# Patient Record
Sex: FEMALE | Race: WHITE | Employment: OTHER | URBAN - METROPOLITAN AREA
[De-identification: names, ages, dates, MRNs, and addresses within clinical notes are randomized per-mention and may not be internally consistent; named-entity substitution may affect disease eponyms.]

---

## 2017-01-26 PROCEDURE — 87086 URINE CULTURE/COLONY COUNT: CPT | Performed by: FAMILY MEDICINE

## 2017-01-27 ENCOUNTER — LAB REQUISITION (OUTPATIENT)
Dept: LAB | Facility: HOSPITAL | Age: 82
End: 2017-01-27
Attending: FAMILY MEDICINE
Payer: MEDICARE

## 2017-01-27 DIAGNOSIS — N39.0 URINARY TRACT INFECTION: ICD-10-CM

## 2017-01-27 DIAGNOSIS — I10 ESSENTIAL (PRIMARY) HYPERTENSION: ICD-10-CM

## 2017-04-20 ENCOUNTER — TELEPHONE (OUTPATIENT)
Dept: NEUROLOGY | Facility: CLINIC | Age: 82
End: 2017-04-20

## 2017-05-20 ENCOUNTER — LAB REQUISITION (OUTPATIENT)
Dept: LAB | Facility: HOSPITAL | Age: 82
End: 2017-05-20
Payer: MEDICARE

## 2017-05-20 DIAGNOSIS — H81.319 AURAL VERTIGO: ICD-10-CM

## 2017-05-20 LAB
BILIRUB UR QL STRIP.AUTO: NEGATIVE
COLOR UR AUTO: YELLOW
GLUCOSE UR STRIP.AUTO-MCNC: >=500 MG/DL
HYALINE CASTS #/AREA URNS AUTO: PRESENT /LPF
KETONES UR STRIP.AUTO-MCNC: NEGATIVE MG/DL
NITRITE UR QL STRIP.AUTO: NEGATIVE
PH UR STRIP.AUTO: 5 [PH] (ref 4.5–8)
PROT UR STRIP.AUTO-MCNC: 30 MG/DL
RBC #/AREA URNS AUTO: >10 /HPF
SP GR UR STRIP.AUTO: 1.01 (ref 1–1.03)
UROBILINOGEN UR STRIP.AUTO-MCNC: <2 MG/DL
WBC #/AREA URNS AUTO: >50 /HPF

## 2017-05-20 PROCEDURE — 87077 CULTURE AEROBIC IDENTIFY: CPT | Performed by: FAMILY MEDICINE

## 2017-05-20 PROCEDURE — 87086 URINE CULTURE/COLONY COUNT: CPT | Performed by: FAMILY MEDICINE

## 2017-05-20 PROCEDURE — 87186 SC STD MICRODIL/AGAR DIL: CPT | Performed by: FAMILY MEDICINE

## 2017-05-20 PROCEDURE — 81001 URINALYSIS AUTO W/SCOPE: CPT | Performed by: FAMILY MEDICINE

## 2017-06-21 ENCOUNTER — OFFICE VISIT (OUTPATIENT)
Dept: NEUROLOGY | Facility: CLINIC | Age: 82
End: 2017-06-21

## 2017-06-21 VITALS
HEART RATE: 88 BPM | WEIGHT: 180 LBS | SYSTOLIC BLOOD PRESSURE: 150 MMHG | BODY MASS INDEX: 28 KG/M2 | DIASTOLIC BLOOD PRESSURE: 80 MMHG | RESPIRATION RATE: 20 BRPM

## 2017-06-21 DIAGNOSIS — H53.8 BLURRY VISION, BILATERAL: ICD-10-CM

## 2017-06-21 DIAGNOSIS — R47.01 APHASIA: Primary | ICD-10-CM

## 2017-06-21 PROCEDURE — 99215 OFFICE O/P EST HI 40 MIN: CPT | Performed by: OTHER

## 2017-06-21 NOTE — PROGRESS NOTES
[facility] Neurology outpatient progress note  Date of service: 6/21/2017    Pt here for f/u on one event of speech difficulty and possible TIA, Pt reported some blurry vision today, it has been a few weeks she experienced difficulty watching TV and reading.  Joanna MetFORMIN HCl ER (GLUCOPHAGE-XR) 500 MG Oral Tablet 24 Hr, Take 500 mg by mouth 2 (two) times daily. , Disp: , Rfl:   •  Multiple Vitamin (TAB-A-ROSA ISELA) Oral Tab, Take 1 tablet by mouth daily. , Disp: , Rfl:   •  dextromethorphan-guaiFENesin (ROBITUSSIN DM) essential hypertension    • Vertigo    • Arthritis    • Weakness    • Other and unspecified hyperlipidemia    • TIA (transient ischemic attack)    • History of blood transfusion    • Pneumonia    • Diverticulitis    • Overactive bladder    • Stroke ([facility] of TIA/stroke  A-fib  Recommendations:  Cont current meds  MRI BRAIN/ORBITS  (CPT=70551/81556)  Ophthalmologist follow up (she has one in [place] already)  Stroke education given  Stroke risk factors management by primary and/or specialists  See orders and

## 2017-06-21 NOTE — PATIENT INSTRUCTIONS
Refill policies:    • Allow 2-3 business days for refills; controlled substances may take longer.   • Contact your pharmacy at least 5 days prior to running out of medication and have them send an electronic request or submit request through the [facility] have a procedure or additional testing performed. [facility]) will contact your insurance carrier to obtain pre-certification or prior authorization.     Unfortunately, [facility] has seen an increase in denial of payment even though the p

## 2017-06-24 ENCOUNTER — LAB REQUISITION (OUTPATIENT)
Dept: LAB | Facility: HOSPITAL | Age: 82
End: 2017-06-24
Attending: FAMILY MEDICINE
Payer: MEDICARE

## 2017-06-24 DIAGNOSIS — I15.9 SECONDARY HYPERTENSION: ICD-10-CM

## 2017-06-24 DIAGNOSIS — N39.0 URINARY TRACT INFECTION: ICD-10-CM

## 2017-06-24 PROCEDURE — 87086 URINE CULTURE/COLONY COUNT: CPT | Performed by: FAMILY MEDICINE

## 2017-06-24 PROCEDURE — 81001 URINALYSIS AUTO W/SCOPE: CPT | Performed by: FAMILY MEDICINE

## 2017-06-24 PROCEDURE — 87077 CULTURE AEROBIC IDENTIFY: CPT | Performed by: FAMILY MEDICINE

## 2017-06-24 PROCEDURE — 87186 SC STD MICRODIL/AGAR DIL: CPT | Performed by: FAMILY MEDICINE

## 2017-06-27 ENCOUNTER — HOSPITAL ENCOUNTER (OUTPATIENT)
Dept: MRI IMAGING | Age: 82
Discharge: HOME | End: 2017-06-27
Attending: Other
Payer: MEDICARE

## 2017-06-27 DIAGNOSIS — H53.8 BLURRY VISION, BILATERAL: ICD-10-CM

## 2017-06-27 DIAGNOSIS — R47.01 APHASIA: ICD-10-CM

## 2017-06-27 PROCEDURE — 70540 MRI ORBIT/FACE/NECK W/O DYE: CPT | Performed by: OTHER

## 2017-06-27 PROCEDURE — 70551 MRI BRAIN STEM W/O DYE: CPT | Performed by: OTHER

## 2017-07-20 ENCOUNTER — HOSPITAL ENCOUNTER (EMERGENCY)
Facility: HOSPITAL | Age: 82
Discharge: HOME | End: 2017-07-20
Attending: EMERGENCY MEDICINE
Payer: MEDICARE

## 2017-07-20 ENCOUNTER — HOSPITAL ENCOUNTER (OUTPATIENT)
Dept: ULTRASOUND IMAGING | Facility: HOSPITAL | Age: 82
End: 2017-07-20
Attending: FAMILY MEDICINE
Payer: MEDICARE

## 2017-07-20 ENCOUNTER — HOSPITAL ENCOUNTER (OUTPATIENT)
Dept: ULTRASOUND IMAGING | Facility: HOSPITAL | Age: 82
Discharge: HOME | End: 2017-07-20
Attending: FAMILY MEDICINE
Payer: MEDICARE

## 2017-07-20 VITALS
RESPIRATION RATE: 16 BRPM | OXYGEN SATURATION: 95 % | WEIGHT: 180.75 LBS | SYSTOLIC BLOOD PRESSURE: 155 MMHG | HEIGHT: 67 IN | BODY MASS INDEX: 28.37 KG/M2 | DIASTOLIC BLOOD PRESSURE: 70 MMHG | HEART RATE: 74 BPM | TEMPERATURE: 97 F

## 2017-07-20 DIAGNOSIS — M79.605 LEFT LEG PAIN: ICD-10-CM

## 2017-07-20 DIAGNOSIS — I73.9 PERIPHERAL ARTERIAL DISEASE (HCC): Primary | ICD-10-CM

## 2017-07-20 DIAGNOSIS — Z91.81 AT RISK FOR FALLING: ICD-10-CM

## 2017-07-20 PROCEDURE — 99282 EMERGENCY DEPT VISIT SF MDM: CPT

## 2017-07-20 PROCEDURE — 99283 EMERGENCY DEPT VISIT LOW MDM: CPT

## 2017-07-20 PROCEDURE — 93926 LOWER EXTREMITY STUDY: CPT | Performed by: FAMILY MEDICINE

## 2017-07-20 NOTE — ED INITIAL ASSESSMENT (HPI)
Pt c/o intermittent left leg pain x 3 weeks, had an US today which showed DVT. Pt denies c/o chest pain or SOB.

## 2017-07-21 NOTE — ED PROVIDER NOTES
Patient Seen in: [facility] Emergency Department    History   Patient presents with:  Deep Vein Thrombosis (cardiovascular)    Stated Complaint: + DVT    HPI    This is a 40-year-old female complaining of left leg pain this patient lives at assisted [facility]) 180 MG Oral Tab,  Take 180 mg by mouth daily. TraMADol HCl 50 MG Oral Tab,  Take 50 mg by mouth every 6 (six) hours as needed. furosemide 20 MG Oral Tab,  Take 20 mg by mouth daily.    ELIQUIS 2.5 MG Oral Tab,  Take 2.5 mg by mouth 2 (two) times da acetaminophen (TYLENOL) 325 MG Oral Tab,  Take 650 mg by mouth every 6 (six) hours as needed for Pain or Fever.  Do not exceed 4 grams       Family History   Problem Relation Age of Onset   • Diabetes Father    • Cancer Mother    • Breast Cancer Mother Course  ------------------------------------------------------------  MDM   CONCLUSION:    1. Findings are highly suspicious for acute or subacute thrombus within the left superficial femoral artery with complete occlusion in the proximal and mid SFA.  Ther

## 2017-08-30 ENCOUNTER — APPOINTMENT (OUTPATIENT)
Dept: CT IMAGING | Facility: HOSPITAL | Age: 82
DRG: 092 | End: 2017-08-30
Attending: EMERGENCY MEDICINE
Payer: MEDICARE

## 2017-08-30 ENCOUNTER — APPOINTMENT (OUTPATIENT)
Dept: CV DIAGNOSTICS | Facility: HOSPITAL | Age: 82
DRG: 092 | End: 2017-08-30
Attending: NURSE PRACTITIONER
Payer: MEDICARE

## 2017-08-30 ENCOUNTER — APPOINTMENT (OUTPATIENT)
Dept: GENERAL RADIOLOGY | Facility: HOSPITAL | Age: 82
DRG: 092 | End: 2017-08-30
Attending: EMERGENCY MEDICINE
Payer: MEDICARE

## 2017-08-30 ENCOUNTER — HOSPITAL ENCOUNTER (INPATIENT)
Facility: HOSPITAL | Age: 82
LOS: 2 days | Discharge: NURSING, REHAB OR LONG-TERM CARE | DRG: 092 | End: 2017-09-02
Attending: EMERGENCY MEDICINE | Admitting: HOSPITALIST
Payer: MEDICARE

## 2017-08-30 ENCOUNTER — APPOINTMENT (OUTPATIENT)
Dept: MRI IMAGING | Facility: HOSPITAL | Age: 82
DRG: 092 | End: 2017-08-30
Attending: EMERGENCY MEDICINE
Payer: MEDICARE

## 2017-08-30 DIAGNOSIS — G45.9 TRANSIENT CEREBRAL ISCHEMIA, UNSPECIFIED TYPE: Primary | ICD-10-CM

## 2017-08-30 LAB
ALBUMIN SERPL-MCNC: 3.5 G/DL (ref 3.5–4.8)
ALP LIVER SERPL-CCNC: 64 U/L (ref 55–142)
ALT SERPL-CCNC: 19 U/L (ref 14–54)
APTT PPP: 28.7 SECONDS (ref 25–34)
AST SERPL-CCNC: 14 U/L (ref 15–41)
ATRIAL RATE: 73 BPM
BASOPHILS # BLD AUTO: 0.08 X10(3) UL (ref 0–0.1)
BASOPHILS NFR BLD AUTO: 0.8 %
BILIRUB SERPL-MCNC: 0.5 MG/DL (ref 0.1–2)
BILIRUB UR QL STRIP.AUTO: NEGATIVE
BUN BLD-MCNC: 10 MG/DL (ref 8–20)
CALCIUM BLD-MCNC: 9.4 MG/DL (ref 8.3–10.3)
CHLORIDE: 103 MMOL/L (ref 101–111)
CHOLEST SMN-MCNC: 157 MG/DL (ref ?–200)
CHOLEST SMN-MCNC: 171 MG/DL (ref ?–200)
CLARITY UR REFRACT.AUTO: CLEAR
CO2: 31 MMOL/L (ref 22–32)
COLOR UR AUTO: YELLOW
CREAT BLD-MCNC: 0.6 MG/DL (ref 0.55–1.02)
EOSINOPHIL # BLD AUTO: 0.07 X10(3) UL (ref 0–0.3)
EOSINOPHIL NFR BLD AUTO: 0.7 %
ERYTHROCYTE [DISTWIDTH] IN BLOOD BY AUTOMATED COUNT: 14.3 % (ref 11.5–16)
EST. AVERAGE GLUCOSE BLD GHB EST-MCNC: 177 MG/DL (ref 68–126)
EST. AVERAGE GLUCOSE BLD GHB EST-MCNC: 177 MG/DL (ref 68–126)
GLUCOSE BLD-MCNC: 127 MG/DL (ref 65–99)
GLUCOSE BLD-MCNC: 134 MG/DL (ref 65–99)
GLUCOSE BLD-MCNC: 144 MG/DL (ref 70–99)
GLUCOSE BLD-MCNC: 145 MG/DL (ref 65–99)
GLUCOSE BLD-MCNC: 145 MG/DL (ref 65–99)
GLUCOSE UR STRIP.AUTO-MCNC: NEGATIVE MG/DL
HBA1C MFR BLD HPLC: 7.8 % (ref ?–5.7)
HBA1C MFR BLD HPLC: 7.8 % (ref ?–5.7)
HCT VFR BLD AUTO: 42.7 % (ref 34–50)
HDLC SERPL-MCNC: 75 MG/DL (ref 45–?)
HDLC SERPL-MCNC: 77 MG/DL (ref 45–?)
HDLC SERPL: 2.09 {RATIO} (ref ?–4.44)
HDLC SERPL: 2.22 {RATIO} (ref ?–4.44)
HGB BLD-MCNC: 14.3 G/DL (ref 12–16)
IMMATURE GRANULOCYTE COUNT: 0.04 X10(3) UL (ref 0–1)
IMMATURE GRANULOCYTE RATIO %: 0.4 %
INR BLD: 1.13 (ref 0.89–1.11)
INR BLD: 1.13 (ref 0.89–1.11)
KETONES UR STRIP.AUTO-MCNC: NEGATIVE MG/DL
LDLC SERPL CALC-MCNC: 37 MG/DL (ref ?–130)
LDLC SERPL CALC-MCNC: 45 MG/DL (ref ?–130)
LDLC SERPL-MCNC: 37 MG/DL (ref 5–40)
LDLC SERPL-MCNC: 57 MG/DL (ref 5–40)
LEUKOCYTE ESTERASE UR QL STRIP.AUTO: NEGATIVE
LYMPHOCYTES # BLD AUTO: 1.69 X10(3) UL (ref 0.9–4)
LYMPHOCYTES NFR BLD AUTO: 16.2 %
M PROTEIN MFR SERPL ELPH: 6.7 G/DL (ref 6.1–8.3)
MCH RBC QN AUTO: 31.1 PG (ref 27–33.2)
MCHC RBC AUTO-ENTMCNC: 33.5 G/DL (ref 31–37)
MCV RBC AUTO: 92.8 FL (ref 81–100)
MONOCYTES # BLD AUTO: 0.84 X10(3) UL (ref 0.1–0.6)
MONOCYTES NFR BLD AUTO: 8 %
NEUTROPHIL ABS PRELIM: 7.72 X10 (3) UL (ref 1.3–6.7)
NEUTROPHILS # BLD AUTO: 7.72 X10(3) UL (ref 1.3–6.7)
NEUTROPHILS NFR BLD AUTO: 73.9 %
NITRITE UR QL STRIP.AUTO: NEGATIVE
NONHDLC SERPL-MCNC: 82 MG/DL (ref ?–130)
NONHDLC SERPL-MCNC: 94 MG/DL (ref ?–130)
P AXIS: 76 DEGREES
P-R INTERVAL: 192 MS
PH UR STRIP.AUTO: 6 [PH] (ref 4.5–8)
PLATELET # BLD AUTO: 288 10(3)UL (ref 150–450)
POTASSIUM SERPL-SCNC: 3.9 MMOL/L (ref 3.6–5.1)
PRO-BETA NATRIURETIC PEPTIDE: 295 PG/ML (ref ?–450)
PROT UR STRIP.AUTO-MCNC: NEGATIVE MG/DL
PSA SERPL DL<=0.01 NG/ML-MCNC: 14.6 SECONDS (ref 12–14.3)
PSA SERPL DL<=0.01 NG/ML-MCNC: 14.6 SECONDS (ref 12–14.3)
Q-T INTERVAL: 386 MS
QRS DURATION: 76 MS
QTC CALCULATION (BEZET): 425 MS
R AXIS: -23 DEGREES
RBC # BLD AUTO: 4.6 X10(6)UL (ref 3.8–5.1)
RBC UR QL AUTO: NEGATIVE
RED CELL DISTRIBUTION WIDTH-SD: 47.8 FL (ref 35.1–46.3)
SODIUM SERPL-SCNC: 140 MMOL/L (ref 136–144)
SP GR UR STRIP.AUTO: 1.01 (ref 1–1.03)
T AXIS: 59 DEGREES
TRIGLYCERIDES: 183 MG/DL (ref ?–150)
TRIGLYCERIDES: 284 MG/DL (ref ?–150)
TROPONIN: <0.046 NG/ML (ref ?–0.05)
UROBILINOGEN UR STRIP.AUTO-MCNC: <2 MG/DL
VENTRICULAR RATE: 73 BPM
WBC # BLD AUTO: 10.4 X10(3) UL (ref 4–13)

## 2017-08-30 PROCEDURE — 99223 1ST HOSP IP/OBS HIGH 75: CPT | Performed by: INTERNAL MEDICINE

## 2017-08-30 PROCEDURE — 71010 XR CHEST AP PORTABLE  (CPT=71010): CPT | Performed by: EMERGENCY MEDICINE

## 2017-08-30 PROCEDURE — 93306 TTE W/DOPPLER COMPLETE: CPT | Performed by: NURSE PRACTITIONER

## 2017-08-30 PROCEDURE — 70544 MR ANGIOGRAPHY HEAD W/O DYE: CPT | Performed by: EMERGENCY MEDICINE

## 2017-08-30 PROCEDURE — 70450 CT HEAD/BRAIN W/O DYE: CPT | Performed by: EMERGENCY MEDICINE

## 2017-08-30 PROCEDURE — 70551 MRI BRAIN STEM W/O DYE: CPT | Performed by: EMERGENCY MEDICINE

## 2017-08-30 RX ORDER — POLYETHYLENE GLYCOL 3350 17 G/17G
17 POWDER, FOR SOLUTION ORAL DAILY PRN
Status: DISCONTINUED | OUTPATIENT
Start: 2017-08-30 | End: 2017-09-02

## 2017-08-30 RX ORDER — ASPIRIN 81 MG/1
324 TABLET, CHEWABLE ORAL ONCE
Status: DISCONTINUED | OUTPATIENT
Start: 2017-08-30 | End: 2017-09-01

## 2017-08-30 RX ORDER — SODIUM PHOSPHATE, DIBASIC AND SODIUM PHOSPHATE, MONOBASIC 7; 19 G/133ML; G/133ML
1 ENEMA RECTAL ONCE AS NEEDED
Status: DISCONTINUED | OUTPATIENT
Start: 2017-08-30 | End: 2017-08-30

## 2017-08-30 RX ORDER — PRAVASTATIN SODIUM 20 MG
20 TABLET ORAL NIGHTLY
Status: DISCONTINUED | OUTPATIENT
Start: 2017-08-30 | End: 2017-09-02

## 2017-08-30 RX ORDER — ASPIRIN 300 MG
300 SUPPOSITORY, RECTAL RECTAL DAILY
Status: DISCONTINUED | OUTPATIENT
Start: 2017-08-30 | End: 2017-09-01

## 2017-08-30 RX ORDER — METOCLOPRAMIDE HYDROCHLORIDE 5 MG/ML
10 INJECTION INTRAMUSCULAR; INTRAVENOUS EVERY 8 HOURS PRN
Status: DISCONTINUED | OUTPATIENT
Start: 2017-08-30 | End: 2017-09-02

## 2017-08-30 RX ORDER — ACETAMINOPHEN 325 MG/1
650 TABLET ORAL EVERY 4 HOURS PRN
Status: DISCONTINUED | OUTPATIENT
Start: 2017-08-30 | End: 2017-09-02

## 2017-08-30 RX ORDER — TETRAHYDROZOLINE HCL 0.05 %
1 DROPS OPHTHALMIC (EYE) 4 TIMES DAILY PRN
Status: ON HOLD | COMMUNITY
End: 2018-02-08

## 2017-08-30 RX ORDER — SODIUM PHOSPHATE, DIBASIC AND SODIUM PHOSPHATE, MONOBASIC 7; 19 G/133ML; G/133ML
1 ENEMA RECTAL ONCE AS NEEDED
Status: DISCONTINUED | OUTPATIENT
Start: 2017-08-30 | End: 2017-09-02

## 2017-08-30 RX ORDER — ONDANSETRON 2 MG/ML
4 INJECTION INTRAMUSCULAR; INTRAVENOUS EVERY 6 HOURS PRN
Status: DISCONTINUED | OUTPATIENT
Start: 2017-08-30 | End: 2017-09-02

## 2017-08-30 RX ORDER — PHENYLEPHRINE HCL IN 0.9% NACL 50MG/250ML
PLASTIC BAG, INJECTION (ML) INTRAVENOUS CONTINUOUS PRN
Status: DISCONTINUED | OUTPATIENT
Start: 2017-08-30 | End: 2017-09-02

## 2017-08-30 RX ORDER — BISACODYL 10 MG
10 SUPPOSITORY, RECTAL RECTAL
Status: DISCONTINUED | OUTPATIENT
Start: 2017-08-30 | End: 2017-09-02

## 2017-08-30 RX ORDER — SENNOSIDES 8.6 MG
17.2 TABLET ORAL NIGHTLY
Status: DISCONTINUED | OUTPATIENT
Start: 2017-08-30 | End: 2017-08-30

## 2017-08-30 RX ORDER — SODIUM CHLORIDE 9 MG/ML
INJECTION, SOLUTION INTRAVENOUS CONTINUOUS
Status: ACTIVE | OUTPATIENT
Start: 2017-08-30 | End: 2017-09-01

## 2017-08-30 RX ORDER — ATORVASTATIN CALCIUM 80 MG/1
80 TABLET, FILM COATED ORAL NIGHTLY
Status: DISCONTINUED | OUTPATIENT
Start: 2017-08-30 | End: 2017-08-30

## 2017-08-30 RX ORDER — DEXTROSE MONOHYDRATE 25 G/50ML
50 INJECTION, SOLUTION INTRAVENOUS
Status: DISCONTINUED | OUTPATIENT
Start: 2017-08-30 | End: 2017-09-02

## 2017-08-30 RX ORDER — GARLIC EXTRACT 500 MG
1 CAPSULE ORAL 2 TIMES DAILY
Status: DISCONTINUED | OUTPATIENT
Start: 2017-08-30 | End: 2017-09-02

## 2017-08-30 RX ORDER — MORPHINE SULFATE 4 MG/ML
1 INJECTION, SOLUTION INTRAMUSCULAR; INTRAVENOUS EVERY 2 HOUR PRN
Status: DISCONTINUED | OUTPATIENT
Start: 2017-08-30 | End: 2017-08-30

## 2017-08-30 RX ORDER — MORPHINE SULFATE 4 MG/ML
2 INJECTION, SOLUTION INTRAMUSCULAR; INTRAVENOUS EVERY 2 HOUR PRN
Status: DISCONTINUED | OUTPATIENT
Start: 2017-08-30 | End: 2017-08-30

## 2017-08-30 RX ORDER — DOCUSATE SODIUM 100 MG/1
100 CAPSULE, LIQUID FILLED ORAL 2 TIMES DAILY
Status: DISCONTINUED | OUTPATIENT
Start: 2017-08-30 | End: 2017-09-02

## 2017-08-30 RX ORDER — FAMOTIDINE 10 MG/ML
20 INJECTION, SOLUTION INTRAVENOUS DAILY
Status: DISCONTINUED | OUTPATIENT
Start: 2017-08-30 | End: 2017-09-02

## 2017-08-30 RX ORDER — LABETALOL HYDROCHLORIDE 5 MG/ML
10 INJECTION, SOLUTION INTRAVENOUS EVERY 10 MIN PRN
Status: DISCONTINUED | OUTPATIENT
Start: 2017-08-30 | End: 2017-09-02

## 2017-08-30 RX ORDER — METOPROLOL SUCCINATE 100 MG/1
100 TABLET, EXTENDED RELEASE ORAL
Status: DISCONTINUED | OUTPATIENT
Start: 2017-08-30 | End: 2017-09-02

## 2017-08-30 RX ORDER — TRAMADOL HYDROCHLORIDE 50 MG/1
50 TABLET ORAL EVERY 6 HOURS PRN
Status: DISCONTINUED | OUTPATIENT
Start: 2017-08-30 | End: 2017-09-02

## 2017-08-30 RX ORDER — FAMOTIDINE 10 MG/ML
20 INJECTION, SOLUTION INTRAVENOUS DAILY
Status: DISCONTINUED | OUTPATIENT
Start: 2017-08-30 | End: 2017-08-30

## 2017-08-30 RX ORDER — ACETAMINOPHEN 650 MG/1
650 SUPPOSITORY RECTAL EVERY 4 HOURS PRN
Status: DISCONTINUED | OUTPATIENT
Start: 2017-08-30 | End: 2017-08-30

## 2017-08-30 RX ORDER — ACETAMINOPHEN 325 MG/1
650 TABLET ORAL EVERY 4 HOURS PRN
Status: DISCONTINUED | OUTPATIENT
Start: 2017-08-30 | End: 2017-08-30

## 2017-08-30 RX ORDER — ONDANSETRON 2 MG/ML
4 INJECTION INTRAMUSCULAR; INTRAVENOUS EVERY 6 HOURS PRN
Status: DISCONTINUED | OUTPATIENT
Start: 2017-08-30 | End: 2017-08-30

## 2017-08-30 RX ORDER — ASPIRIN 325 MG
325 TABLET ORAL DAILY
Status: DISCONTINUED | OUTPATIENT
Start: 2017-08-30 | End: 2017-08-31

## 2017-08-30 RX ORDER — FAMOTIDINE 20 MG/1
20 TABLET ORAL DAILY
Status: DISCONTINUED | OUTPATIENT
Start: 2017-08-30 | End: 2017-09-02

## 2017-08-30 RX ORDER — HYDROCODONE BITARTRATE AND ACETAMINOPHEN 5; 325 MG/1; MG/1
1 TABLET ORAL EVERY 6 HOURS PRN
Status: ON HOLD | COMMUNITY
End: 2018-10-26

## 2017-08-30 RX ORDER — FAMOTIDINE 20 MG/1
20 TABLET ORAL DAILY
Status: DISCONTINUED | OUTPATIENT
Start: 2017-08-30 | End: 2017-08-30

## 2017-08-30 RX ORDER — BISACODYL 10 MG
10 SUPPOSITORY, RECTAL RECTAL
Status: DISCONTINUED | OUTPATIENT
Start: 2017-08-30 | End: 2017-08-30

## 2017-08-30 RX ORDER — FUROSEMIDE 20 MG/1
20 TABLET ORAL DAILY
Status: DISCONTINUED | OUTPATIENT
Start: 2017-08-30 | End: 2017-09-02

## 2017-08-30 RX ORDER — MORPHINE SULFATE 4 MG/ML
2 INJECTION, SOLUTION INTRAMUSCULAR; INTRAVENOUS EVERY 2 HOUR PRN
Status: DISCONTINUED | OUTPATIENT
Start: 2017-08-30 | End: 2017-09-02

## 2017-08-30 RX ORDER — ASPIRIN AND DIPYRIDAMOLE 25; 200 MG/1; MG/1
1 CAPSULE, EXTENDED RELEASE ORAL 2 TIMES DAILY
Status: DISCONTINUED | OUTPATIENT
Start: 2017-08-30 | End: 2017-09-02

## 2017-08-30 RX ORDER — DOCUSATE SODIUM 100 MG/1
100 CAPSULE, LIQUID FILLED ORAL 2 TIMES DAILY
Status: DISCONTINUED | OUTPATIENT
Start: 2017-08-30 | End: 2017-08-30

## 2017-08-30 RX ORDER — POLYETHYLENE GLYCOL 3350 17 G/17G
17 POWDER, FOR SOLUTION ORAL DAILY PRN
Status: DISCONTINUED | OUTPATIENT
Start: 2017-08-30 | End: 2017-08-30

## 2017-08-30 RX ORDER — METOCLOPRAMIDE HYDROCHLORIDE 5 MG/ML
10 INJECTION INTRAMUSCULAR; INTRAVENOUS EVERY 8 HOURS PRN
Status: DISCONTINUED | OUTPATIENT
Start: 2017-08-30 | End: 2017-08-30

## 2017-08-30 RX ORDER — GUAIFENESIN 100 MG/5ML
100 SYRUP ORAL EVERY 4 HOURS PRN
COMMUNITY
End: 2018-09-21 | Stop reason: CLARIF

## 2017-08-30 RX ORDER — LABETALOL HYDROCHLORIDE 5 MG/ML
10 INJECTION, SOLUTION INTRAVENOUS EVERY 10 MIN PRN
Status: DISCONTINUED | OUTPATIENT
Start: 2017-08-30 | End: 2017-08-30

## 2017-08-30 RX ORDER — PREDNISONE 1 MG/1
5 TABLET ORAL DAILY
Status: DISCONTINUED | OUTPATIENT
Start: 2017-08-30 | End: 2017-09-02

## 2017-08-30 RX ORDER — MORPHINE SULFATE 4 MG/ML
1 INJECTION, SOLUTION INTRAMUSCULAR; INTRAVENOUS EVERY 2 HOUR PRN
Status: DISCONTINUED | OUTPATIENT
Start: 2017-08-30 | End: 2017-09-02

## 2017-08-30 RX ORDER — SENNOSIDES 8.6 MG
17.2 TABLET ORAL NIGHTLY
Status: DISCONTINUED | OUTPATIENT
Start: 2017-08-30 | End: 2017-09-02

## 2017-08-30 RX ORDER — DILTIAZEM HYDROCHLORIDE 180 MG/1
180 CAPSULE, EXTENDED RELEASE ORAL DAILY
Status: DISCONTINUED | OUTPATIENT
Start: 2017-08-30 | End: 2017-09-02

## 2017-08-30 RX ORDER — ACETAMINOPHEN 650 MG/1
650 SUPPOSITORY RECTAL EVERY 4 HOURS PRN
Status: DISCONTINUED | OUTPATIENT
Start: 2017-08-30 | End: 2017-09-02

## 2017-08-30 NOTE — PROGRESS NOTES
[id number] [facility] Evaluation    Linda Thakurlester Patient Status:  Emergency    1924 MRN [id number]   Location [address] Attending Orlin Oakes, [facility] Day # 0 PCP MD Henry Zazueta 1788 SURGICAL HISTORY      Comment: hernia repair  No date: OTHER SURGICAL HISTORY      Comment: femur fx repair  No date: REPAIR ING HERNIA,5+Y/O,REDUCIBL    FAMILY HISTORY:  family history includes Breast Cancer in her mother; Cancer in her mother; Diabetes i or leg weakness noted. Finger-to-nose coordination is intact. Gait deferred.   NIH 4.    DIAGNOSTIC DATA:   Lab Results  Component Value Date   WBC 10.4 08/30/2017   HGB 14.3 08/30/2017   HCT 42.7 08/30/2017   .0 08/30/2017   CREATSERUM 0.60 08/30/2

## 2017-08-30 NOTE — PROGRESS NOTES
08/30/17 1738   Clinical Encounter Type   Visited With Patient and family together  (Daughter at bedside)   Routine Visit Introduction   Continue Visiting No   Patient's Supportive Strategies/Resources  provided emotional support, including acti

## 2017-08-30 NOTE — PROGRESS NOTES
Attempted echo. Half way through exam patient became agitated and refused to finish exam.  Nurse notified.

## 2017-08-30 NOTE — SLP NOTE
Attempted to see pt for speech therapy services. Pt not available due to testing at this time. Will follow up as scheduling permits. Thank you.     Briana Galdamez M.S. [address] [facility]  Pager [phone]

## 2017-08-30 NOTE — ED PROVIDER NOTES
Patient Seen in: [facility] Emergency Department    History   Patient presents with:  Stroke (neurologic)    Stated Complaint:     HPI    24-year-old female presents emergency room for evaluation of possible stroke symptoms.   Patient presents with s by mouth daily. Fexofenadine HCl (HM FEXOFENADINE HCL) 180 MG Oral Tab,  Take 180 mg by mouth daily. TraMADol HCl 50 MG Oral Tab,  Take 50 mg by mouth every 6 (six) hours as needed. furosemide 20 MG Oral Tab,  Take 20 mg by mouth daily.    ELIQUIS 2.5 20 MG Oral Tab,  Take 20 mg by mouth nightly. acetaminophen (TYLENOL) 325 MG Oral Tab,  Take 650 mg by mouth every 6 (six) hours as needed for Pain or Fever.  Do not exceed 4 grams       Family History   Problem Relation Age of Onset   • Diabetes Father tenderness  EXTREMITIES: No peripheral edema, no calf tenderness, dorsal pedal pulses present and equal bilaterally. SKIN: Warm, dry, intact, no rashes.   NEUROLOGIC EXAM: Tongue midline, no facial drooping, no ptosis, muscle strength +5/5 bilateral upper BLUE   RAINBOW DRAW GOLD   RAINBOW DRAW LAVENDER   RAINBOW DRAW LIGHT GREEN     EKG    Rate, intervals and axes as noted on EKG Report.   Rate: 73  Rhythm: Sinus Rhythm  Reading: Normal sinus rhythm, no acute ST or T-wave abnormality         A total of 35 m

## 2017-08-30 NOTE — H&P
[facility] HOSPITALIST  History and Physical     [facility] Patient Status:  Emergency    1924 MRN [id number]   Location [address] [facility] Attending Ab Villatoro, [address] [facility][note 3] Day # 0 PCP Annette Phan MD     Chief Complaint fx repair  No date: REPAIR ING HERNIA,5+Y/O,REDUCIBL    Social History:  reports that she quit smoking about 28 years ago. Her smoking use included Cigarettes. She has a 75.00 pack-year smoking history.  She has never used smokeless tobacco. She reports love MetFORMIN HCl ER (GLUCOPHAGE-XR) 500 MG Oral Tablet 24 Hr Take 500 mg by mouth 2 (two) times daily. Disp:  Rfl:    Multiple Vitamin (TAB-A-ROSA ISELA) Oral Tab Take 1 tablet by mouth daily.  Disp:  Rfl:    dextromethorphan-guaiFENesin (ROBITUSSIN DM)  M membranes. EOM-I. PERRLA. Anicteric. Neck: No lymphadenopathy. No JVD. No carotid bruits. Respiratory: Clear to auscultation bilaterally. No wheezes. No rhonchi. Cardiovascular: S1, S2. Regular rate and rhythm. No murmurs, rubs or gallops. Equal pulses.

## 2017-08-30 NOTE — ED INITIAL ASSESSMENT (HPI)
Per medics pt is from assisted living, care taker noticed pt slurring words and having trouble speaking. Pt denies any pain.

## 2017-08-31 ENCOUNTER — APPOINTMENT (OUTPATIENT)
Dept: CT IMAGING | Facility: HOSPITAL | Age: 82
DRG: 092 | End: 2017-08-31
Attending: NURSE PRACTITIONER
Payer: MEDICARE

## 2017-08-31 LAB
GLUCOSE BLD-MCNC: 183 MG/DL (ref 65–99)
GLUCOSE BLD-MCNC: 193 MG/DL (ref 65–99)
GLUCOSE BLD-MCNC: 201 MG/DL (ref 65–99)
GLUCOSE BLD-MCNC: 240 MG/DL (ref 65–99)
GLUCOSE BLD-MCNC: 41 MG/DL (ref 65–99)
GLUCOSE BLD-MCNC: 44 MG/DL (ref 65–99)

## 2017-08-31 PROCEDURE — 99223 1ST HOSP IP/OBS HIGH 75: CPT | Performed by: OTHER

## 2017-08-31 PROCEDURE — 95816 EEG AWAKE AND DROWSY: CPT | Performed by: OTHER

## 2017-08-31 PROCEDURE — 99239 HOSP IP/OBS DSCHRG MGMT >30: CPT | Performed by: INTERNAL MEDICINE

## 2017-08-31 PROCEDURE — 70450 CT HEAD/BRAIN W/O DYE: CPT | Performed by: NURSE PRACTITIONER

## 2017-08-31 RX ORDER — HALOPERIDOL 5 MG/ML
1 INJECTION INTRAMUSCULAR ONCE
Status: COMPLETED | OUTPATIENT
Start: 2017-08-31 | End: 2017-08-31

## 2017-08-31 NOTE — PLAN OF CARE
Assumed care at 1900. No acute issues overnight. Prn Tramadol for c/o back pain. A/o x 4, forgetful. Still with slight expressive aphasia. Follows commands. NSR on tele. Incontinent, briefed. IVF per order. Vitals stable.      0740: glucose 41

## 2017-08-31 NOTE — PROCEDURES
Date of Procedure: 8/31/2017    Procedure: EEG (ELECTROENCEPHALOGRAM)     DX:  ASPHASIA,  B/L BLURRY VISION  HX:  89 Y/O FEMALE PRESENT TO ED WITH DIFFICULTY SPEAKING.   NOTED, DAUGHTER NOTICED AROUND 9:30 PT WAS UNABLE TO SPEAK, CODE STROKE WAS CALLED AT 1

## 2017-08-31 NOTE — PROGRESS NOTES
Rapid Response Note    RRT called for change in mental status    Patient seen at bedside, daughter at bedside  Patient is alert, awake, oriented to self and time, but not to place  /64 (BP Location: Right arm)   Pulse 64   Temp 97.9 °F (36.6 °C) (Ora

## 2017-08-31 NOTE — CM/SW NOTE
Pt seen for CVA protocol. Pt is a 80 y o female who came from [address] [place] [address] where she has lived since 2010. Pt has 2 dtrs and 1 son. Pt's dtr [facility] is her HCPOA.   Pt has had [facility] in the past.  Pt was at [employer] for [facility] about 5 year

## 2017-08-31 NOTE — SLP NOTE
ADULT SWALLOWING EVALUATION    ASSESSMENT & PLAN   ASSESSMENT  Orders were received for a bedside swallowing evaluation per stroke protocol.  Patient was admitted on 8/30 with stroke protocol initiated and note from neurology Still with slight expressive ap or unspecified type diabetes mellitus without mention of complication, not stated as uncontrolled    • Unspecified essential hypertension    • Urinary tract infection    • Vertigo    • Visual impairment     glasses   • Weakness        Prior Living Situatio implementation of aspiration precautions and swallow strategies independently over 1 session(s). Goal #3 Patient will participate in a speech language cognitive assessment.       FOLLOW UP  Treatment Plan: Communication evaluation;Dysphagia therapy  Heidy

## 2017-08-31 NOTE — OCCUPATIONAL THERAPY NOTE
Pt noted to have increased confusion during OT josy RN present and aware, Rapid Response team called to assess. Pt will need new orders to resume OT when medically appropriate.      Thank you for allowing me to care for this patient,   Robb Rivas,

## 2017-08-31 NOTE — PROGRESS NOTES
[facility] HOSPITALIST  Progress Note     Vivian Newton Patient Status:  Observation    1924 MRN [id number]   [facility] 7NE-A Attending Nestor Clayton MD;Jordi*   Hosp Day # 0 PCP More Chen MD     Chief Complaint: slurred speec • famoTIDine  20 mg Intravenous Daily   • Aspirin-Dipyridamole ER  1 capsule Oral BID   • DilTIAZem HCl ER Coated Beads  180 mg Oral Daily   • apixaban  2.5 mg Oral BID   • furosemide  20 mg Oral Daily   • insulin detemir  41 Units Subcutaneous Nightly

## 2017-08-31 NOTE — PHYSICAL THERAPY NOTE
This therapist presented to room with OT already present with RN. RN and OT assessing patients level of orientation. Daughter present and states her current presentation is not her cognitive baseline. RRT called.  Will follow up with PT assessment as approp

## 2017-08-31 NOTE — OCCUPATIONAL THERAPY NOTE
OCCUPATIONAL THERAPY EVALUATION - INPATIENT     Room Number: 0545/6596-K  Evaluation Date: 8/31/2017  Type of Evaluation: Initial  Presenting Problem: expressive aphasia, transient cerebral ischemia     Physician Order: IP Consult to Occupational Therapy SITE RIG*  No date: OTHER SURGICAL HISTORY Bilateral      Comment: knee replacement  No date: OTHER SURGICAL HISTORY      Comment: colon resection  No date: OTHER SURGICAL HISTORY      Comment: hernia repair  No date: OTHER SURGICAL HISTORY      Comment: f NEUROLOGICAL FINDINGS  Neurological Findings: None                ACTIVITY TOLERANCE  Room air  No shortness of breath    ACTIVITIES OF DAILY LIVING ASSESSMENT  AM-PAC ‘6-Clicks’ Inpatient Daily Activity Short Form  How much help from another person do dysfunction, decreased endurance, balance, strength, ROM and functional mobility. These deficits manifest functionally while performing dressing, bathing, toileting, self-feeding and grooming.    The patient is below baseline and would benefit from skilled

## 2017-08-31 NOTE — PLAN OF CARE
RRT called when patient became confused suddenly  Patient was assessed by Stroke team including dr. Germaine Navarro and Dr. Costa Sharif  CT/CTA ordered       [phone]  Neuro exam post RR, still confused, left side slightly weaker then before  No complaints of pain  Resting c

## 2017-09-01 LAB
GLUCOSE BLD-MCNC: 130 MG/DL (ref 65–99)
GLUCOSE BLD-MCNC: 189 MG/DL (ref 65–99)
GLUCOSE BLD-MCNC: 206 MG/DL (ref 65–99)
GLUCOSE BLD-MCNC: 261 MG/DL (ref 65–99)
GLUCOSE BLD-MCNC: 47 MG/DL (ref 65–99)
GLUCOSE BLD-MCNC: 48 MG/DL (ref 65–99)
GLUCOSE BLD-MCNC: 72 MG/DL (ref 65–99)
GLUCOSE BLD-MCNC: 93 MG/DL (ref 65–99)
GLUCOSE BLD-MCNC: 97 MG/DL (ref 65–99)

## 2017-09-01 PROCEDURE — 99232 SBSQ HOSP IP/OBS MODERATE 35: CPT | Performed by: OTHER

## 2017-09-01 PROCEDURE — 99232 SBSQ HOSP IP/OBS MODERATE 35: CPT | Performed by: INTERNAL MEDICINE

## 2017-09-01 NOTE — PLAN OF CARE
Assumed care at 0100. No acute issues. Pt is alert to self, knows president is Jimmie. Thinks she is in the \"[place]\" and the year is 2080. Follows commands. Denies numbness, tingling or pain. NSR on tele. Vitals stable.

## 2017-09-01 NOTE — PROGRESS NOTES
Received report from outgoing RN about patient increasing AMS since RRT this afternoon. Neuro assessment complete, Dr. Peace Reyes updated, physician stated this is no different from what he witnessed earlier and has no new orders at this time.

## 2017-09-01 NOTE — PROGRESS NOTES
Hypoglycemia noted this AM during shift change. Pt received breakfast tray at the time of protocol initiation and now eating breakfast.  Dr. Vinod Rosa notified face-to-face, nightly Levemir dose adjusted. Will continue to monitor BG per protocol.

## 2017-09-01 NOTE — PROGRESS NOTES
Pt seen by JARRELL Lee, Neurology this AM.  Pt's dtr wanted to see Neurologist on-call sooner than later. Called Ricky Davey to find out when Dr. Raheem Yost could see pt, advised to page Dr. Raheem Yost directly. Dr. Raheem Yost paged @ [phone], but no call back.   Check

## 2017-09-01 NOTE — SLP NOTE
SPEECH/LANGUAGE/COGNITIVE EVALUATION - INPATIENT    Admission Date: 8/30/2017  Evaluation Date: 09/01/17    Reason for Referral: Stroke protocol    ASSESSMENT & PLAN   ASSESSMENT  Contacted patient at the bedside with her daughter present.  Daughter reports solutions    SWALLOW/ORAL MOTOR  WFL for tolerance of regular with thin liquids. Patient Experiencing Pain: No    PLAN FOR TREATMENT  Trial of skilled SLP services for cognitive communicative deficits. Meal monitor with recommended diet.      GOALS  G

## 2017-09-01 NOTE — PHYSICAL THERAPY NOTE
PHYSICAL THERAPY EVALUATION - INPATIENT     Room Number: 2801/9123-D  Evaluation Date: 9/1/2017  Type of Evaluation: Initial  Physician Order: PT Eval and Treat    Presenting Problem: Slurred speech, aphasia  Reason for Therapy: Mobility Dysfunction an SURGERY  1998: [facility] NEEDLE LOCALIZATION W/ SPECIMEN 1 SITE RIG*  No date: OTHER SURGICAL HISTORY Bilateral      Comment: knee replacement  No date: OTHER SURGICAL HISTORY      Comment: colon resection  No date: OTHER SURGICAL HISTORY      Comment: hernia re Sitting down on and standing up from a chair with arms (e.g., wheelchair, bedside commode, etc.): A Lot   -   Moving from lying on back to sitting on the side of the bed?: A Lot   How much help from another person does the patient currently need. ..   -   M the L LE affecting her overall functional skills and task tolerance as well as willing to participate with task. L LE is weaker than the L likely due to pain? rather true TIA?  Overall base on her daughters report,pt is observed to have a significant  declin

## 2017-09-01 NOTE — PROGRESS NOTES
[facility] HOSPITALIST  Progress Note     Abbe Torres Patient Status:  Observation    1924 MRN [id number]   [place] 7NE-A Attending Adrienne Palomino MD;Jordi*   Hosp Day # 1 PCP Doug Rios MD     Chief Complaint: slurred speec Nightly   • docusate sodium  100 mg Oral BID   • famoTIDine  20 mg Oral Daily    Or   • famoTIDine  20 mg Intravenous Daily   • Aspirin-Dipyridamole ER  1 capsule Oral BID   • DilTIAZem HCl ER Coated Beads  180 mg Oral Daily   • apixaban  2.5 mg Oral BID

## 2017-09-01 NOTE — PROGRESS NOTES
[id number] [facility] Progress Note    Ev Powell Patient Status:  Inpatient    1924 MRN [id number]   [facility] 7NE-A Attending Margot Wang MD   Hosp Day # 1 PCP Moncho Spears MD         Subjective:  Andrew Manning Imaging:    MRI brain/MRA brain 8/30: CONCLUSION:    1. No acute infarct. HCT 8/31 CONCLUSION:    1. No acute intracranial hemorrhage. 2. Moderate chronic small vessel ischemic disease with small chronic infarct within the left paracentral kennedy.  If type    Assessment/Plan:    1. Expressive aphasia with an episode of confusion on 8/31 possible TIA vs TME from narcotic use ???  1. MRI no acute infarct   2. EEG unremarkable   3. Cont home Eliquis and aggrenox  4. Cont home statin   5. PT/OT   6.  Limit n follow simple commands -     CN: PERRL, EOMI; face symmetric, sensation intact, tongue and palate midline, otherwise, CN 2-12 intact  Motor: 5/5 strength throughout, tone normal - no drift   Sensory: intact to light touch throughout   Coord: FNF intact wit MD on 8/30/2017 at 12:39     Approved by: Hernán Velarde MD                 Assessment/Plan:   Altered mental status: agree ddx TME vs TIA vs neurodegenerative disorder - suspect multifactorial - MRI with no actue stroke but extensive white matter changes and

## 2017-09-01 NOTE — OCCUPATIONAL THERAPY NOTE
OCCUPATIONAL THERAPY EVALUATION - INPATIENT     Room Number: 2097/1242-D  Evaluation Date: 9/1/2017  Type of Evaluation: Re-evaluation  Presenting Problem: expressive aphasia, possible TIA    Physician Order: IP Consult to Occupational Therapy  Reason for Surgical History:  No date: APPENDECTOMY  No date: COLECTOMY  2012: HIP SURGERY Right  No date: KNEE REPLACEMENT SURGERY  1998: [facility] NEEDLE LOCALIZATION W/ SPECIMEN 1 SITE RIG*  No date: OTHER SURGICAL HISTORY Bilateral      Comment: knee replacement  No da functional limits     Upper extremity strength is within functional limits     COORDINATION  Gross Motor    [facility]    Fine Motor    [facility]      ADDITIONAL TESTS                                    NEUROLOGICAL FINDINGS  Neurological Findings: None activity endurance, and L LE weakness. These deficits manifest functionally while performing ADL: LE ADL max A, toileting max A, toilet transfer mod A.    The patient is below baseline and would benefit from skilled inpatient OT to address the above defici

## 2017-09-02 VITALS
OXYGEN SATURATION: 96 % | SYSTOLIC BLOOD PRESSURE: 143 MMHG | BODY MASS INDEX: 28.25 KG/M2 | TEMPERATURE: 98 F | HEART RATE: 82 BPM | WEIGHT: 180 LBS | RESPIRATION RATE: 16 BRPM | HEIGHT: 67 IN | DIASTOLIC BLOOD PRESSURE: 60 MMHG

## 2017-09-02 LAB
C-REACTIVE PROTEIN: 8.5 MG/DL (ref ?–1)
GLUCOSE BLD-MCNC: 113 MG/DL (ref 65–99)
GLUCOSE BLD-MCNC: 116 MG/DL (ref 65–99)
GLUCOSE BLD-MCNC: 151 MG/DL (ref 65–99)
GLUCOSE BLD-MCNC: 56 MG/DL (ref 65–99)
HSCRP: >28.5 MG/L (ref ?–3)
SED RATE-ML: 26 MM/HR (ref 0–25)

## 2017-09-02 PROCEDURE — 99239 HOSP IP/OBS DSCHRG MGMT >30: CPT | Performed by: HOSPITALIST

## 2017-09-02 RX ORDER — PREDNISONE 20 MG/1
40 TABLET ORAL DAILY
Qty: 6 TABLET | Refills: 0 | Status: SHIPPED | OUTPATIENT
Start: 2017-09-02 | End: 2017-09-02

## 2017-09-02 RX ORDER — PREDNISONE 20 MG/1
40 TABLET ORAL ONCE
Status: COMPLETED | OUTPATIENT
Start: 2017-09-02 | End: 2017-09-02

## 2017-09-02 RX ORDER — PREDNISONE 10 MG/1
TABLET ORAL
Qty: 90 TABLET | Refills: 2 | Status: ON HOLD | OUTPATIENT
Start: 2017-09-02 | End: 2018-02-08

## 2017-09-02 NOTE — PLAN OF CARE
Assumed care. Immediately upon assuming care of patient, daughter upset with care and not happy with neurologist that saw patient. Not happy that we aren't doing something more for her mother.  Demanding that all paperwork for discharge be ready by the [facility]

## 2017-09-02 NOTE — CM/SW NOTE
Pt accepted at [facility]. piter confirmed plan with dtr. sw arranged [facility] for 2pm. PCS form completed.  RN to call report to [phone]

## 2017-09-02 NOTE — CM/SW NOTE
09/02/17 1100   Discharge disposition   Discharged to: Skilled Nurs   Name of Gopal Fitzgerald   Discharge transportation [address]  [phone] [facility] of [address]

## 2017-09-02 NOTE — PROGRESS NOTES
Discharged to [facility] via [facility] by daughter Nicolásopal Dorantesmarry  Belongings packed up by daughter     Report given to receiving RN Brianna Williamson at [facility]. All paperwork given to paramedics. Daughter took all belongings with her.  Fe Welch

## 2017-09-02 NOTE — CM/SW NOTE
sw contacted by moises DWYER at [facility] this AM informing worker that patient cannot return to [facility] today as she requires too much assistance. She has also informed saludr brenton who is also at bedside.  piter met with alverto farris and explained pt will need

## 2017-09-02 NOTE — PROGRESS NOTES
Notified by primary that patient has history of vasculitis for which she is on steroids      Defer management of steroids to primary service    Mercy Gore MD, Neurology  [facility]  Pager [phone]  9/2/2017

## 2017-09-02 NOTE — PROGRESS NOTES
Patient having intermittent garbled speech and aphasia. However, w/u for acute stroke negative. Unclear if related to her underlying dementia or possibly repeated TIAs.   Patient's daughter adamant that daughter imprved on steroids 8 years ago for similar

## 2017-09-02 NOTE — PLAN OF CARE
Assumed care of patient at 0700  AOx2- oriented to person, place, drowsy, RA, NSR on tele  VSS  Slight left facial droop, expressive aphasia at times  Incontinent- briefed  Tolerating diet  Daughter at bedside updated on [facility] [facility] for d/c to [facility]

## 2017-09-02 NOTE — PLAN OF CARE
Assumed care @ 0700. Pt a/o x2,  Alert to person and time, disoriented to place and situation. Neuro checks q4, slight left facial droop when smiling and mild aphasia. NIH 2.  Neurologically improving compared to night RN's report. VSS. Tele SR.   No a

## 2017-09-03 NOTE — DISCHARGE SUMMARY
[facility] HOSPITALIST  DISCHARGE SUMMARY     Mj Wilkins Patient Status:  Inpatient    1924 MRN [id number]   [facility] 7NE-A Attending No att. providers found   Hosp Day # 2 PCP Jonnathan Zazueta MD     Date of Admission: 2017 diagnosis of vasculitis, seemingly a nonspecifically identified vasculitis.   Her CRP was elevated as well, so regardless of whether or not her aphasia has anything to do with vasculitis or not, we agreed that we should probably increase her prednisone for by mouth daily as needed for Heartburn. Refills:  0     Calcium-D 600-400 MG-UNIT Tabs      Take 1 tablet by mouth 2 (two) times daily.    Refills:  0     Clobetasol Propionate 0.05 % Oint  Commonly known as:  TEMOVATE       Refills:  0     DilTIAZem HCl (four) times daily as needed. Refills:  0     saccharomyces boulardii 250 MG Caps  Commonly known as:  FLORASTOR      Take 250 mg by mouth 2 (two) times daily. Refills:  0     TAB-A-ROSA ISELA Tabs      Take 1 tablet by mouth daily.    Refills:  0     tetrahy edema.  -----------------------------------------------------------------------------------------------  PATIENT DISCHARGE INSTRUCTIONS: See electronic chart    Musa Yang MD 9/3/2017    Time spent:  > 30 minutes

## 2017-11-15 ENCOUNTER — LAB REQUISITION (OUTPATIENT)
Dept: LAB | Facility: HOSPITAL | Age: 82
End: 2017-11-15
Attending: FAMILY MEDICINE
Payer: MEDICARE

## 2017-11-15 DIAGNOSIS — R53.1 WEAKNESS: ICD-10-CM

## 2017-11-15 DIAGNOSIS — R41.82 ALTERED MENTAL STATUS: ICD-10-CM

## 2017-11-15 DIAGNOSIS — I10 ESSENTIAL (PRIMARY) HYPERTENSION: ICD-10-CM

## 2017-11-15 DIAGNOSIS — H81.4 VERTIGO OF CENTRAL ORIGIN: ICD-10-CM

## 2017-11-15 DIAGNOSIS — Z00.00 ENCOUNTER FOR GENERAL ADULT MEDICAL EXAMINATION WITHOUT ABNORMAL FINDINGS: ICD-10-CM

## 2017-11-15 PROCEDURE — 81001 URINALYSIS AUTO W/SCOPE: CPT | Performed by: FAMILY MEDICINE

## 2017-11-15 PROCEDURE — 87077 CULTURE AEROBIC IDENTIFY: CPT | Performed by: FAMILY MEDICINE

## 2017-11-15 PROCEDURE — 87186 SC STD MICRODIL/AGAR DIL: CPT | Performed by: FAMILY MEDICINE

## 2017-11-15 PROCEDURE — 87086 URINE CULTURE/COLONY COUNT: CPT | Performed by: FAMILY MEDICINE

## 2017-11-30 PROCEDURE — 81001 URINALYSIS AUTO W/SCOPE: CPT

## 2017-11-30 PROCEDURE — 87184 SC STD DISK METHOD PER PLATE: CPT

## 2017-11-30 PROCEDURE — 87186 SC STD MICRODIL/AGAR DIL: CPT

## 2017-11-30 PROCEDURE — 87086 URINE CULTURE/COLONY COUNT: CPT

## 2017-11-30 PROCEDURE — 87077 CULTURE AEROBIC IDENTIFY: CPT

## 2017-12-01 ENCOUNTER — NURSE ONLY (OUTPATIENT)
Dept: LAB | Age: 82
End: 2017-12-01
Attending: FAMILY MEDICINE
Payer: MEDICARE

## 2017-12-01 DIAGNOSIS — N39.0 UTI (URINARY TRACT INFECTION): Primary | ICD-10-CM

## 2017-12-18 ENCOUNTER — LAB ENCOUNTER (OUTPATIENT)
Dept: LAB | Age: 82
End: 2017-12-18
Attending: FAMILY MEDICINE
Payer: MEDICARE

## 2017-12-18 DIAGNOSIS — R53.1 WEAKNESS: ICD-10-CM

## 2017-12-18 DIAGNOSIS — I10 HTN (HYPERTENSION): ICD-10-CM

## 2017-12-18 DIAGNOSIS — N39.0 UTI (URINARY TRACT INFECTION): Primary | ICD-10-CM

## 2017-12-18 PROCEDURE — 87086 URINE CULTURE/COLONY COUNT: CPT

## 2017-12-18 PROCEDURE — 81001 URINALYSIS AUTO W/SCOPE: CPT

## 2017-12-29 ENCOUNTER — NURSE ONLY (OUTPATIENT)
Dept: LAB | Age: 82
End: 2017-12-29
Attending: FAMILY MEDICINE
Payer: MEDICARE

## 2017-12-29 DIAGNOSIS — N39.0 UTI (URINARY TRACT INFECTION): Primary | ICD-10-CM

## 2017-12-29 DIAGNOSIS — R53.1 WEAKNESS: ICD-10-CM

## 2017-12-29 DIAGNOSIS — I10 HTN (HYPERTENSION): ICD-10-CM

## 2017-12-29 PROCEDURE — 36415 COLL VENOUS BLD VENIPUNCTURE: CPT

## 2017-12-29 PROCEDURE — 85652 RBC SED RATE AUTOMATED: CPT

## 2017-12-29 PROCEDURE — 86140 C-REACTIVE PROTEIN: CPT

## 2018-02-07 ENCOUNTER — APPOINTMENT (OUTPATIENT)
Dept: GENERAL RADIOLOGY | Facility: HOSPITAL | Age: 83
DRG: 193 | End: 2018-02-07
Payer: MEDICARE

## 2018-02-07 ENCOUNTER — LAB REQUISITION (OUTPATIENT)
Dept: LAB | Facility: HOSPITAL | Age: 83
DRG: 193 | End: 2018-02-07
Payer: MEDICARE

## 2018-02-07 ENCOUNTER — HOSPITAL ENCOUNTER (INPATIENT)
Facility: HOSPITAL | Age: 83
LOS: 3 days | Discharge: NURSING, REHAB OR LONG-TERM CARE | DRG: 193 | End: 2018-02-10
Attending: EMERGENCY MEDICINE | Admitting: STUDENT IN AN ORGANIZED HEALTH CARE EDUCATION/TRAINING PROGRAM
Payer: MEDICARE

## 2018-02-07 DIAGNOSIS — R53.1 WEAKNESS: ICD-10-CM

## 2018-02-07 DIAGNOSIS — R09.02 HYPOXIA: ICD-10-CM

## 2018-02-07 DIAGNOSIS — J06.9 UPPER RESPIRATORY TRACT INFECTION, UNSPECIFIED TYPE: Primary | ICD-10-CM

## 2018-02-07 DIAGNOSIS — T75.23XA: ICD-10-CM

## 2018-02-07 DIAGNOSIS — R73.9 HYPERGLYCEMIA: ICD-10-CM

## 2018-02-07 PROCEDURE — 87633 RESP VIRUS 12-25 TARGETS: CPT | Performed by: FAMILY MEDICINE

## 2018-02-07 PROCEDURE — 87581 M.PNEUMON DNA AMP PROBE: CPT | Performed by: FAMILY MEDICINE

## 2018-02-07 PROCEDURE — 87486 CHLMYD PNEUM DNA AMP PROBE: CPT | Performed by: FAMILY MEDICINE

## 2018-02-07 PROCEDURE — 99222 1ST HOSP IP/OBS MODERATE 55: CPT | Performed by: STUDENT IN AN ORGANIZED HEALTH CARE EDUCATION/TRAINING PROGRAM

## 2018-02-07 PROCEDURE — 87798 DETECT AGENT NOS DNA AMP: CPT | Performed by: FAMILY MEDICINE

## 2018-02-07 PROCEDURE — 71046 X-RAY EXAM CHEST 2 VIEWS: CPT

## 2018-02-07 RX ORDER — POLYETHYLENE GLYCOL 3350 17 G/17G
17 POWDER, FOR SOLUTION ORAL 2 TIMES DAILY
Status: DISCONTINUED | OUTPATIENT
Start: 2018-02-07 | End: 2018-02-10

## 2018-02-07 RX ORDER — BISACODYL 10 MG
10 SUPPOSITORY, RECTAL RECTAL
Status: DISCONTINUED | OUTPATIENT
Start: 2018-02-07 | End: 2018-02-10

## 2018-02-07 RX ORDER — PRAVASTATIN SODIUM 20 MG
20 TABLET ORAL NIGHTLY
Status: DISCONTINUED | OUTPATIENT
Start: 2018-02-07 | End: 2018-02-08

## 2018-02-07 RX ORDER — IPRATROPIUM BROMIDE AND ALBUTEROL SULFATE 2.5; .5 MG/3ML; MG/3ML
3 SOLUTION RESPIRATORY (INHALATION)
Status: DISCONTINUED | OUTPATIENT
Start: 2018-02-08 | End: 2018-02-08

## 2018-02-07 RX ORDER — AZITHROMYCIN 250 MG/1
500 TABLET, FILM COATED ORAL
Status: COMPLETED | OUTPATIENT
Start: 2018-02-08 | End: 2018-02-10

## 2018-02-07 RX ORDER — ACETAMINOPHEN 325 MG/1
650 TABLET ORAL EVERY 6 HOURS PRN
Status: DISCONTINUED | OUTPATIENT
Start: 2018-02-07 | End: 2018-02-10

## 2018-02-07 RX ORDER — CETIRIZINE HYDROCHLORIDE 10 MG/1
10 TABLET ORAL DAILY
Status: DISCONTINUED | OUTPATIENT
Start: 2018-02-07 | End: 2018-02-10

## 2018-02-07 RX ORDER — DILTIAZEM HYDROCHLORIDE 180 MG/1
180 CAPSULE, EXTENDED RELEASE ORAL DAILY
Status: DISCONTINUED | OUTPATIENT
Start: 2018-02-07 | End: 2018-02-10

## 2018-02-07 RX ORDER — GARLIC EXTRACT 500 MG
1 CAPSULE ORAL 2 TIMES DAILY
Status: DISCONTINUED | OUTPATIENT
Start: 2018-02-07 | End: 2018-02-08

## 2018-02-07 RX ORDER — DEXTROSE MONOHYDRATE 25 G/50ML
50 INJECTION, SOLUTION INTRAVENOUS
Status: DISCONTINUED | OUTPATIENT
Start: 2018-02-07 | End: 2018-02-10

## 2018-02-07 RX ORDER — ONDANSETRON 2 MG/ML
4 INJECTION INTRAMUSCULAR; INTRAVENOUS EVERY 6 HOURS PRN
Status: DISCONTINUED | OUTPATIENT
Start: 2018-02-07 | End: 2018-02-10

## 2018-02-07 RX ORDER — ASPIRIN AND DIPYRIDAMOLE 25; 200 MG/1; MG/1
1 CAPSULE, EXTENDED RELEASE ORAL 2 TIMES DAILY
Status: DISCONTINUED | OUTPATIENT
Start: 2018-02-07 | End: 2018-02-10

## 2018-02-07 RX ORDER — CALCIUM CARBONATE 200(500)MG
1000 TABLET,CHEWABLE ORAL DAILY PRN
Status: DISCONTINUED | OUTPATIENT
Start: 2018-02-07 | End: 2018-02-10

## 2018-02-07 RX ORDER — OXYBUTYNIN CHLORIDE 5 MG/1
5 TABLET, EXTENDED RELEASE ORAL DAILY
Status: DISCONTINUED | OUTPATIENT
Start: 2018-02-07 | End: 2018-02-10

## 2018-02-07 RX ORDER — DOCUSATE SODIUM 100 MG/1
100 CAPSULE, LIQUID FILLED ORAL DAILY
Status: DISCONTINUED | OUTPATIENT
Start: 2018-02-07 | End: 2018-02-10

## 2018-02-07 RX ORDER — SODIUM CHLORIDE 9 MG/ML
125 INJECTION, SOLUTION INTRAVENOUS CONTINUOUS
Status: DISCONTINUED | OUTPATIENT
Start: 2018-02-07 | End: 2018-02-08

## 2018-02-07 RX ORDER — HYDROCODONE BITARTRATE AND ACETAMINOPHEN 5; 325 MG/1; MG/1
1 TABLET ORAL EVERY 6 HOURS PRN
Status: DISCONTINUED | OUTPATIENT
Start: 2018-02-07 | End: 2018-02-10

## 2018-02-07 RX ORDER — PREDNISONE 10 MG/1
10 TABLET ORAL DAILY
Status: DISCONTINUED | OUTPATIENT
Start: 2018-02-07 | End: 2018-02-08

## 2018-02-07 RX ORDER — FUROSEMIDE 20 MG/1
20 TABLET ORAL DAILY
Status: DISCONTINUED | OUTPATIENT
Start: 2018-02-07 | End: 2018-02-10

## 2018-02-07 RX ORDER — METOPROLOL SUCCINATE 100 MG/1
100 TABLET, EXTENDED RELEASE ORAL
Status: DISCONTINUED | OUTPATIENT
Start: 2018-02-08 | End: 2018-02-10

## 2018-02-08 PROCEDURE — 99232 SBSQ HOSP IP/OBS MODERATE 35: CPT | Performed by: HOSPITALIST

## 2018-02-08 RX ORDER — MAGNESIUM OXIDE 400 MG (241.3 MG MAGNESIUM) TABLET
400 TABLET ONCE
Status: COMPLETED | OUTPATIENT
Start: 2018-02-08 | End: 2018-02-08

## 2018-02-08 RX ORDER — OSELTAMIVIR PHOSPHATE 75 MG/1
75 CAPSULE ORAL 2 TIMES DAILY
Status: DISCONTINUED | OUTPATIENT
Start: 2018-02-08 | End: 2018-02-10

## 2018-02-08 RX ORDER — PREDNISONE 20 MG/1
20 TABLET ORAL DAILY
Status: DISCONTINUED | OUTPATIENT
Start: 2018-02-08 | End: 2018-02-08

## 2018-02-08 RX ORDER — PREDNISONE 10 MG/1
10 TABLET ORAL DAILY
COMMUNITY
End: 2018-02-10

## 2018-02-08 RX ORDER — POTASSIUM CHLORIDE 20 MEQ/1
40 TABLET, EXTENDED RELEASE ORAL EVERY 4 HOURS
Status: COMPLETED | OUTPATIENT
Start: 2018-02-08 | End: 2018-02-08

## 2018-02-08 RX ORDER — L.ACID,PARA/B.BIFIDUM/S.THERM 8B CELL
1 CAPSULE ORAL DAILY
COMMUNITY

## 2018-02-08 RX ORDER — SODIUM CHLORIDE 9 MG/ML
INJECTION, SOLUTION INTRAVENOUS CONTINUOUS
Status: DISCONTINUED | OUTPATIENT
Start: 2018-02-08 | End: 2018-02-09

## 2018-02-08 RX ORDER — MULTIVITAMIN
1 TABLET ORAL DAILY
COMMUNITY

## 2018-02-08 RX ORDER — IPRATROPIUM BROMIDE AND ALBUTEROL SULFATE 2.5; .5 MG/3ML; MG/3ML
3 SOLUTION RESPIRATORY (INHALATION)
Status: DISCONTINUED | OUTPATIENT
Start: 2018-02-08 | End: 2018-02-08

## 2018-02-08 RX ORDER — POTASSIUM CHLORIDE 20 MEQ/1
40 TABLET, EXTENDED RELEASE ORAL SEE ADMIN INSTRUCTIONS
Status: DISCONTINUED | OUTPATIENT
Start: 2018-02-08 | End: 2018-02-08

## 2018-02-08 RX ORDER — IPRATROPIUM BROMIDE AND ALBUTEROL SULFATE 2.5; .5 MG/3ML; MG/3ML
3 SOLUTION RESPIRATORY (INHALATION)
Status: DISCONTINUED | OUTPATIENT
Start: 2018-02-08 | End: 2018-02-10

## 2018-02-08 NOTE — ED NOTES
Attempt to obtain UA via in and out cath, 8 Malaysian. Pt's vaginal region was red and tender to touch. Pt cleaned and cried out \"ouch\", patient's daughter stated that she would give 30 seconds for RN and EST to complete procedure.  Catheter placed w/ no out

## 2018-02-08 NOTE — H&P
[facility]I HOSPITALIST  History and Physical     Velma Oliver Patient Status:  Emergency    1924 MRN [id number]   Location [address] [facility] Attending Trang Butcher MD   Hosp Day # 0 PCP Adry Jerry MD     Chief Complaint: Indu Keys History:   Family History   Problem Relation Age of Onset   • Diabetes Father    • Cancer Mother    • Breast Cancer Mother        Allergies:   Shellfish-Derived P*    Nausea and vomiting  Radiology Contrast *        Comment:Pt and pt's daughter both confir Disp:  Rfl:    Aspirin-Dipyridamole ER (AGGRENOX)  MG Oral Capsule SR 12 Hr Take 1 capsule by mouth 2 (two) times daily. Disp:  Rfl:    docusate sodium (COLACE) 100 MG Oral Cap Take 100 mg by mouth daily.  Disp:  Rfl:    Potassium Chloride ER (K-DUR 98.6 °F (37 °C) (Temporal)   Resp 20   Ht 5' 7\" (1.702 m)   Wt 185 lb (83.9 kg)   LMP  (LMP Unknown)   SpO2 92%   BMI 28.98 kg/m²   General: No acute distress. Alert and oriented x 3. HEENT: Normocephalic atraumatic. Moist mucous membranes. EOM-I.  PERRLA

## 2018-02-08 NOTE — ED NOTES
Patient's daughter updated on room assignment, unhappy with patient placement in [address] vs [address] [facility] (cardiac) [facility]. Pt wishes to speak to nursing supervisor on room placement.  Nursing supervisor called per request and spoke with patient's daughter, r

## 2018-02-08 NOTE — PROGRESS NOTES
[facility]ILI HOSPITALIST  Progress Note     Mesha Meier Patient Status:  Inpatient    1924 MRN [id number]   [facility] 8NE-A Attending Jeremiah Leach MD   Hosp Day # 1 PCP Crystal Bay MD     Chief Complaint: Cough    S: Patient ple • apixaban  2.5 mg Oral BID   • cetirizine  10 mg Oral Daily   • furosemide  20 mg Oral Daily   • insulin detemir  41 Units Subcutaneous Daily   • Metoprolol Succinate ER  100 mg Oral Daily Beta Blocker   • PEG 3350  17 g Oral BID   • Oxybutynin Chloride

## 2018-02-08 NOTE — HOME CARE LIAISON
Referral received from  Shanna De La Cruz for home health. Patient has a history with [facility] in the past.  I spoke with Brant Vargasant her daughter and she is declining home health at this time.   Thank you for the referral.

## 2018-02-08 NOTE — ED NOTES
Pt and daughter updated on room change and that report has been called. No further questions/concerns voiced at this time. Pt in remains in no distress and resting abed at this time.

## 2018-02-08 NOTE — PROGRESS NOTES
LILI HOSPITALIST  Progress Note     Angelo Banuelos Patient Status:  Inpatient    1924 MRN [id number]   [facility] 8NE-A Attending Carey Izaguirre MD   Hosp Day # 1 PCP Emile Day MD     Chief Complaint: URI    S: Patient   Gar Oral BID   • DilTIAZem HCl ER Coated Beads  180 mg Oral Daily   • docusate sodium  100 mg Oral Daily   • apixaban  2.5 mg Oral BID   • cetirizine  10 mg Oral Daily   • furosemide  20 mg Oral Daily   • insulin detemir  41 Units Subcutaneous Daily   • Metopr

## 2018-02-08 NOTE — PLAN OF CARE
Diabetes/Glucose Control    • Glucose maintained within prescribed range Progressing        Patient/Family Goals    • Patient/Family Long Term Goal Progressing    • Patient/Family Short Term Goal Progressing          HX: Afib (Eliquis), HTN, DM, CVA, Falls

## 2018-02-09 PROCEDURE — 99232 SBSQ HOSP IP/OBS MODERATE 35: CPT | Performed by: HOSPITALIST

## 2018-02-09 RX ORDER — MAGNESIUM OXIDE 400 MG (241.3 MG MAGNESIUM) TABLET
400 TABLET ONCE
Status: COMPLETED | OUTPATIENT
Start: 2018-02-09 | End: 2018-02-09

## 2018-02-09 RX ORDER — SODIUM CHLORIDE 450 MG/100ML
INJECTION, SOLUTION INTRAVENOUS CONTINUOUS
Status: DISCONTINUED | OUTPATIENT
Start: 2018-02-09 | End: 2018-02-10

## 2018-02-09 NOTE — OCCUPATIONAL THERAPY NOTE
OCCUPATIONAL THERAPY QUICK EVALUATION - INPATIENT    Room Number: 4549/9542-R  Evaluation Date: 2/9/2018     Type of Evaluation: Quick Eval  Presenting Problem: influenza, acute bronchospasm    Physician Order: IP Consult to Occupational Therapy  Reason fo facility  Home Layout: One level  Lives With: Staff 24 hours    Toilet and Equipment: Comfort height toilet;Grab bar  Shower/Tub and Equipment: Walk-in shower;Grab bar; Shower chair          Hand Dominance: Right  Drives: No  Patient Regularly Uses: Glasses sit, min A sit to stand, min A with RW to take steps in the room. Pt fatigues easily. Using 2 liters oxygen, 93% to 97%. Room air, 89%. Educated the pt to continue with UE HEP to improve overall circulation. Pt and daughter verbalized understanding.  Pt ha

## 2018-02-09 NOTE — PLAN OF CARE
Assumed care for patient at 0730 on 2/8. Droplet iso for influenza A H3. Tamiflu ordered BID. Oriented to person only. Confusion more than baseline per family and facility at [facility]. VSS on 2L NC. NSR. .  Need orthostatic VS, endorsed to night shift

## 2018-02-09 NOTE — PROGRESS NOTES
LILI HOSPITALIST  Progress Note     Gita Rodriguez Patient Status:  Inpatient    1924 MRN [id number]   [facility] 8NE-A Attending Abhishek Driscoll MD   Hosp Day # 2 PCP Christophe Hamilton MD     Chief Complaint: Cough    S: Patient wit breakfast   • ipratropium-albuterol  3 mL Nebulization QID   • Aspirin-Dipyridamole ER  1 capsule Oral BID   • DilTIAZem HCl ER Coated Beads  180 mg Oral Daily   • docusate sodium  100 mg Oral Daily   • apixaban  2.5 mg Oral BID   • cetirizine  10 mg Oral

## 2018-02-09 NOTE — SLP NOTE
ADULT SWALLOWING EVALUATION    ASSESSMENT    ASSESSMENT/OVERALL IMPRESSION:  Orders were received for a bedside swallowing evaluation as patient presented with coughing and swallowing difficulty on night shift of 2/8/18.  Patient with history of stroke with nose over 5 years ago   • Diverticulitis    • History of blood transfusion    • Other and unspecified hyperlipidemia    • Overactive bladder    • Pneumonia    • Skin cancer    • Stroke ([facility] [address]     2000   • TIA (transient ischemic attack)    • Type II or unsp complaints consistent with possible esophageal involvement         GOALS  Goals are not warranted as patient presents with a functional oropharyngeal swallow free of clinical s/s of aspiration.        FOLLOW UP   Patient with functional oropharyngeal swallo

## 2018-02-09 NOTE — PLAN OF CARE
Diabetes/Glucose Control    • Glucose maintained within prescribed range Progressing        Patient/Family Goals    • Patient/Family Long Term Goal Progressing    • Patient/Family Short Term Goal Progressing            HX: Afib (Eliquis), HTN, DM, CVA, Fal

## 2018-02-09 NOTE — PHYSICAL THERAPY NOTE
PHYSICAL THERAPY QUICK EVALUATION - INPATIENT    Room Number: 2299/8822-C  Evaluation Date: 2/9/2018  Presenting Problem: influenza, bronchospasm  Physician Order: PT Eval and Treat     Pt is 80year old female admitted on 2/7/2018 from [place] [address] with a Patient Owned Equipment: Rolling walker; Other (Comment) (W/C)  Patient Regularly Uses: Glasses    Prior Level of [place]: PLOF obtained from daughter, Arnell Cockayne. Pt lives at [facility]. She transfers to W/C with assistance.  Daughter states she has bee Provided: Pt performed supine to sit with min A. Pt performed sit to stand with min A. Pt ambulated within room with RW, min a for balance. Min A for transfer to chair. Pt does become anxious with ambulation due to fear of falling.  Updated pt on role of PT

## 2018-02-10 VITALS
BODY MASS INDEX: 27.64 KG/M2 | HEIGHT: 67 IN | HEART RATE: 78 BPM | TEMPERATURE: 97 F | RESPIRATION RATE: 16 BRPM | WEIGHT: 176.13 LBS | SYSTOLIC BLOOD PRESSURE: 132 MMHG | DIASTOLIC BLOOD PRESSURE: 71 MMHG | OXYGEN SATURATION: 91 %

## 2018-02-10 PROCEDURE — 99239 HOSP IP/OBS DSCHRG MGMT >30: CPT | Performed by: HOSPITALIST

## 2018-02-10 RX ORDER — OSELTAMIVIR PHOSPHATE 75 MG/1
75 CAPSULE ORAL 2 TIMES DAILY
Qty: 5 CAPSULE | Refills: 0 | Status: SHIPPED | OUTPATIENT
Start: 2018-02-10 | End: 2018-02-13

## 2018-02-10 RX ORDER — PREDNISONE 50 MG/1
50 TABLET ORAL
Status: DISCONTINUED | OUTPATIENT
Start: 2018-02-11 | End: 2018-02-10

## 2018-02-10 RX ORDER — OSELTAMIVIR PHOSPHATE 75 MG/1
75 CAPSULE ORAL 2 TIMES DAILY
Qty: 5 CAPSULE | Refills: 0 | Status: SHIPPED | OUTPATIENT
Start: 2018-02-10 | End: 2018-02-10

## 2018-02-10 RX ORDER — PREDNISONE 10 MG/1
TABLET ORAL
Qty: 60 TABLET | Refills: 0 | Status: SHIPPED | OUTPATIENT
Start: 2018-02-10 | End: 2018-09-21 | Stop reason: CLARIF

## 2018-02-10 NOTE — DISCHARGE SUMMARY
LILI HOSPITALIST  DISCHARGE SUMMARY     Jarrell Wilkins Patient Status:  Inpatient    1924 MRN [id number]   [facility] 8NE-A Attending Adrienne Palomino MD   [facility] Day # 3 PCP Doug Rios MD     Date of Admission: 2018  Date of START taking these medications      Instructions Prescription details   Oseltamivir Phosphate 75 MG Caps  Commonly known as:  TAMIFLU      Take 1 capsule (75 mg total) by mouth 2 (two) times daily.    Stop taking on:  2/13/2018  Quantity:  5 capsule  R Place 1 drop into both eyes 2 (two) times daily as needed (for itchy eyes). Max dose 2 drops each eye/24 hours   Refills:  0     furosemide 20 MG Tabs  Commonly known as:  LASIX      Take 20 mg by mouth daily.    Refills:  0     guaiFENesin 100 MG/5ML Syrp Your Medications      Please  your prescriptions at the location directed by your doctor or nurse    Bring a paper prescription for each of these medications  · Oseltamivir Phosphate 75 MG Caps  · predniSONE 10 MG Tabs         [place]

## 2018-02-10 NOTE — CM/SW NOTE
piter notified that pt can discharge today back to [facility]. piter spoke to pts JARRED Wise at [facility] and confirmed pt can return. RN to call report to [phone]. dtr requesting transportation.  [facility] [phone] for 530pm. PCS form complete

## 2018-02-10 NOTE — PROGRESS NOTES
LILI HOSPITALIST  Progress Note     Chaim Givens Patient Status:  Inpatient    1924 MRN [id number]   [facility] 8NE-A Attending Randall Aschoff, MD   Hosp Day # 3 PCP Nereyda Brown MD     Chief Complaint: Cough    S: Patient wit QID   • Aspirin-Dipyridamole ER  1 capsule Oral BID   • DilTIAZem HCl ER Coated Beads  180 mg Oral Daily   • docusate sodium  100 mg Oral Daily   • apixaban  2.5 mg Oral BID   • cetirizine  10 mg Oral Daily   • furosemide  20 mg Oral Daily   • insulin dete

## 2018-02-11 NOTE — PROGRESS NOTES
Report was given to RN at [facility]. Rx faxed to [facility] per daughter request. Tamiflu given early per daughter's request. AVS faxed to [facility]. All instructions given in regards to follow up appointments, medications, side effects, and symptoms to watch for.  IV

## 2018-02-20 ENCOUNTER — NURSE ONLY (OUTPATIENT)
Dept: LAB | Age: 83
End: 2018-02-20
Attending: FAMILY MEDICINE
Payer: MEDICARE

## 2018-02-20 DIAGNOSIS — R69 DIAGNOSIS UNKNOWN: Primary | ICD-10-CM

## 2018-02-20 LAB
BASOPHILS # BLD AUTO: 0.09 X10(3) UL (ref 0–0.1)
BASOPHILS NFR BLD AUTO: 0.7 %
BUN BLD-MCNC: 20 MG/DL (ref 8–20)
CALCIUM BLD-MCNC: 10.6 MG/DL (ref 8.3–10.3)
CHLORIDE: 100 MMOL/L (ref 101–111)
CO2: 30 MMOL/L (ref 22–32)
CREAT BLD-MCNC: 0.84 MG/DL (ref 0.55–1.02)
EOSINOPHIL # BLD AUTO: 0.11 X10(3) UL (ref 0–0.3)
EOSINOPHIL NFR BLD AUTO: 0.8 %
ERYTHROCYTE [DISTWIDTH] IN BLOOD BY AUTOMATED COUNT: 14 % (ref 11.5–16)
GLUCOSE BLD-MCNC: 354 MG/DL (ref 70–99)
HCT VFR BLD AUTO: 45.3 % (ref 34–50)
HGB BLD-MCNC: 15 G/DL (ref 12–16)
IMMATURE GRANULOCYTE COUNT: 0.19 X10(3) UL (ref 0–1)
IMMATURE GRANULOCYTE RATIO %: 1.5 %
LYMPHOCYTES # BLD AUTO: 3.54 X10(3) UL (ref 0.9–4)
LYMPHOCYTES NFR BLD AUTO: 27.1 %
MCH RBC QN AUTO: 30 PG (ref 27–33.2)
MCHC RBC AUTO-ENTMCNC: 33.1 G/DL (ref 31–37)
MCV RBC AUTO: 90.6 FL (ref 81–100)
MONOCYTES # BLD AUTO: 0.82 X10(3) UL (ref 0.1–1)
MONOCYTES NFR BLD AUTO: 6.3 %
NEUTROPHIL ABS PRELIM: 8.32 X10 (3) UL (ref 1.3–6.7)
NEUTROPHILS # BLD AUTO: 8.32 X10(3) UL (ref 1.3–6.7)
NEUTROPHILS NFR BLD AUTO: 63.6 %
PLATELET # BLD AUTO: 416 10(3)UL (ref 150–450)
POTASSIUM SERPL-SCNC: 3.8 MMOL/L (ref 3.6–5.1)
RBC # BLD AUTO: 5 X10(6)UL (ref 3.8–5.1)
RED CELL DISTRIBUTION WIDTH-SD: 45.7 FL (ref 35.1–46.3)
SODIUM SERPL-SCNC: 138 MMOL/L (ref 136–144)
WBC # BLD AUTO: 13.1 X10(3) UL (ref 4–13)

## 2018-02-20 PROCEDURE — 36415 COLL VENOUS BLD VENIPUNCTURE: CPT

## 2018-02-20 PROCEDURE — 85025 COMPLETE CBC W/AUTO DIFF WBC: CPT

## 2018-02-20 PROCEDURE — 80048 BASIC METABOLIC PNL TOTAL CA: CPT

## 2018-02-24 ENCOUNTER — NURSE ONLY (OUTPATIENT)
Dept: LAB | Age: 83
End: 2018-02-24
Attending: FAMILY MEDICINE
Payer: MEDICARE

## 2018-02-24 DIAGNOSIS — R19.7 DIARRHEA: Primary | ICD-10-CM

## 2018-02-24 PROCEDURE — 87493 C DIFF AMPLIFIED PROBE: CPT

## 2018-02-24 PROCEDURE — 87045 FECES CULTURE AEROBIC BACT: CPT

## 2018-02-24 PROCEDURE — 87046 STOOL CULTR AEROBIC BACT EA: CPT

## 2018-03-29 ENCOUNTER — LAB ENCOUNTER (OUTPATIENT)
Dept: LAB | Age: 83
End: 2018-03-29
Attending: FAMILY MEDICINE
Payer: MEDICARE

## 2018-03-29 DIAGNOSIS — N39.0 UTI (URINARY TRACT INFECTION): Primary | ICD-10-CM

## 2018-03-29 DIAGNOSIS — I10 HTN (HYPERTENSION): ICD-10-CM

## 2018-03-29 DIAGNOSIS — R53.1 WEAKNESS: ICD-10-CM

## 2018-03-29 PROCEDURE — 87086 URINE CULTURE/COLONY COUNT: CPT

## 2018-03-29 PROCEDURE — 81001 URINALYSIS AUTO W/SCOPE: CPT

## 2018-03-29 PROCEDURE — 87077 CULTURE AEROBIC IDENTIFY: CPT

## 2018-03-29 PROCEDURE — 87186 SC STD MICRODIL/AGAR DIL: CPT

## 2018-03-30 LAB
BILIRUB UR QL STRIP.AUTO: NEGATIVE
COLOR UR AUTO: YELLOW
GLUCOSE UR STRIP.AUTO-MCNC: 50 MG/DL
KETONES UR STRIP.AUTO-MCNC: NEGATIVE MG/DL
NITRITE UR QL STRIP.AUTO: POSITIVE
PH UR STRIP.AUTO: 5 [PH] (ref 4.5–8)
PROT UR STRIP.AUTO-MCNC: 30 MG/DL
RBC #/AREA URNS AUTO: >10 /HPF
SP GR UR STRIP.AUTO: 1.01 (ref 1–1.03)
UROBILINOGEN UR STRIP.AUTO-MCNC: <2 MG/DL
WBC #/AREA URNS AUTO: >50 /HPF
WBC CLUMPS UR QL AUTO: PRESENT

## 2018-08-31 ENCOUNTER — NURSE ONLY (OUTPATIENT)
Dept: LAB | Age: 83
End: 2018-08-31
Attending: FAMILY MEDICINE
Payer: MEDICARE

## 2018-08-31 DIAGNOSIS — E11.9 DM (DIABETES MELLITUS) (HCC): Primary | ICD-10-CM

## 2018-08-31 LAB
EST. AVERAGE GLUCOSE BLD GHB EST-MCNC: 303 MG/DL (ref 68–126)
HBA1C MFR BLD HPLC: 12.2 % (ref ?–5.7)

## 2018-08-31 PROCEDURE — 83036 HEMOGLOBIN GLYCOSYLATED A1C: CPT

## 2018-08-31 PROCEDURE — 36415 COLL VENOUS BLD VENIPUNCTURE: CPT

## 2018-09-04 ENCOUNTER — LAB REQUISITION (OUTPATIENT)
Dept: LAB | Facility: HOSPITAL | Age: 83
End: 2018-09-04
Attending: FAMILY MEDICINE
Payer: MEDICARE

## 2018-09-04 DIAGNOSIS — R82.998 OTHER ABNORMAL FINDINGS IN URINE: ICD-10-CM

## 2018-09-04 DIAGNOSIS — N39.0 URINARY TRACT INFECTION: ICD-10-CM

## 2018-09-04 DIAGNOSIS — I15.8 OTHER SECONDARY HYPERTENSION: ICD-10-CM

## 2018-09-04 LAB
BILIRUB UR QL STRIP.AUTO: NEGATIVE
COLOR UR AUTO: YELLOW
GLUCOSE UR STRIP.AUTO-MCNC: NEGATIVE MG/DL
KETONES UR STRIP.AUTO-MCNC: NEGATIVE MG/DL
NITRITE UR QL STRIP.AUTO: NEGATIVE
PH UR STRIP.AUTO: 6 [PH] (ref 4.5–8)
PROT UR STRIP.AUTO-MCNC: NEGATIVE MG/DL
RBC UR QL AUTO: NEGATIVE
SP GR UR STRIP.AUTO: 1.01 (ref 1–1.03)
UROBILINOGEN UR STRIP.AUTO-MCNC: <2 MG/DL
WBC #/AREA URNS AUTO: >50 /HPF
WBC CLUMPS UR QL AUTO: PRESENT

## 2018-09-04 PROCEDURE — 87186 SC STD MICRODIL/AGAR DIL: CPT | Performed by: FAMILY MEDICINE

## 2018-09-04 PROCEDURE — 87086 URINE CULTURE/COLONY COUNT: CPT | Performed by: FAMILY MEDICINE

## 2018-09-04 PROCEDURE — 81001 URINALYSIS AUTO W/SCOPE: CPT | Performed by: FAMILY MEDICINE

## 2018-09-04 PROCEDURE — 87077 CULTURE AEROBIC IDENTIFY: CPT | Performed by: FAMILY MEDICINE

## 2018-09-21 ENCOUNTER — HOSPITAL ENCOUNTER (INPATIENT)
Facility: HOSPITAL | Age: 83
LOS: 2 days | Discharge: NURSING, REHAB OR LONG-TERM CARE | DRG: 690 | End: 2018-09-23
Attending: EMERGENCY MEDICINE | Admitting: HOSPITALIST
Payer: MEDICARE

## 2018-09-21 ENCOUNTER — APPOINTMENT (OUTPATIENT)
Dept: CT IMAGING | Facility: HOSPITAL | Age: 83
DRG: 690 | End: 2018-09-21
Attending: EMERGENCY MEDICINE
Payer: MEDICARE

## 2018-09-21 ENCOUNTER — APPOINTMENT (OUTPATIENT)
Dept: GENERAL RADIOLOGY | Facility: HOSPITAL | Age: 83
DRG: 690 | End: 2018-09-21
Attending: EMERGENCY MEDICINE
Payer: MEDICARE

## 2018-09-21 DIAGNOSIS — R53.1 RIGHT SIDED WEAKNESS: Primary | ICD-10-CM

## 2018-09-21 DIAGNOSIS — D72.829 LEUKOCYTOSIS, UNSPECIFIED TYPE: ICD-10-CM

## 2018-09-21 LAB
ALBUMIN SERPL-MCNC: 3 G/DL (ref 3.5–4.8)
ALBUMIN/GLOB SERPL: 0.8 {RATIO} (ref 1–2)
ALP LIVER SERPL-CCNC: 99 U/L (ref 55–142)
ALT SERPL-CCNC: 13 U/L (ref 14–54)
ANION GAP SERPL CALC-SCNC: 6 MMOL/L (ref 0–18)
APTT PPP: 29.2 SECONDS (ref 26.1–34.6)
AST SERPL-CCNC: 10 U/L (ref 15–41)
ATRIAL RATE: 109 BPM
BASOPHILS # BLD AUTO: 0.07 X10(3) UL (ref 0–0.1)
BASOPHILS NFR BLD AUTO: 0.4 %
BILIRUB SERPL-MCNC: 0.6 MG/DL (ref 0.1–2)
BUN BLD-MCNC: 13 MG/DL (ref 8–20)
BUN/CREAT SERPL: 20.3 (ref 10–20)
CALCIUM BLD-MCNC: 9.3 MG/DL (ref 8.3–10.3)
CHLORIDE SERPL-SCNC: 101 MMOL/L (ref 101–111)
CO2 SERPL-SCNC: 33 MMOL/L (ref 22–32)
CREAT BLD-MCNC: 0.64 MG/DL (ref 0.55–1.02)
EOSINOPHIL # BLD AUTO: 0.02 X10(3) UL (ref 0–0.3)
EOSINOPHIL NFR BLD AUTO: 0.1 %
ERYTHROCYTE [DISTWIDTH] IN BLOOD BY AUTOMATED COUNT: 14.6 % (ref 11.5–16)
GLOBULIN PLAS-MCNC: 3.6 G/DL (ref 2.5–4)
GLUCOSE BLD-MCNC: 139 MG/DL (ref 70–99)
GLUCOSE BLD-MCNC: 183 MG/DL (ref 65–99)
HCT VFR BLD AUTO: 39.9 % (ref 34–50)
HGB BLD-MCNC: 12.7 G/DL (ref 12–16)
IMMATURE GRANULOCYTE COUNT: 0.08 X10(3) UL (ref 0–1)
IMMATURE GRANULOCYTE RATIO %: 0.5 %
INR BLD: 1.04 (ref 0.9–1.1)
LACTIC ACID: 1.8 MMOL/L (ref 0.5–2)
LYMPHOCYTES # BLD AUTO: 1.96 X10(3) UL (ref 0.9–4)
LYMPHOCYTES NFR BLD AUTO: 11.3 %
M PROTEIN MFR SERPL ELPH: 6.6 G/DL (ref 6.1–8.3)
MCH RBC QN AUTO: 29.5 PG (ref 27–33.2)
MCHC RBC AUTO-ENTMCNC: 31.8 G/DL (ref 31–37)
MCV RBC AUTO: 92.8 FL (ref 81–100)
MONOCYTES # BLD AUTO: 1.02 X10(3) UL (ref 0.1–1)
MONOCYTES NFR BLD AUTO: 5.9 %
NEUTROPHIL ABS PRELIM: 14.24 X10 (3) UL (ref 1.3–6.7)
NEUTROPHILS # BLD AUTO: 14.24 X10(3) UL (ref 1.3–6.7)
NEUTROPHILS NFR BLD AUTO: 81.8 %
OSMOLALITY SERPL CALC.SUM OF ELEC: 292 MOSM/KG (ref 275–295)
P AXIS: 4 DEGREES
P-R INTERVAL: 168 MS
PLATELET # BLD AUTO: 365 10(3)UL (ref 150–450)
POTASSIUM SERPL-SCNC: 3.8 MMOL/L (ref 3.6–5.1)
PROCALCITONIN SERPL-MCNC: <0.11 NG/ML
PSA SERPL DL<=0.01 NG/ML-MCNC: 14 SECONDS (ref 12.4–14.7)
Q-T INTERVAL: 336 MS
QRS DURATION: 74 MS
QTC CALCULATION (BEZET): 452 MS
R AXIS: -22 DEGREES
RBC # BLD AUTO: 4.3 X10(6)UL (ref 3.8–5.1)
RED CELL DISTRIBUTION WIDTH-SD: 49.9 FL (ref 35.1–46.3)
SODIUM SERPL-SCNC: 140 MMOL/L (ref 136–144)
T AXIS: 75 DEGREES
TROPONIN I SERPL-MCNC: <0.046 NG/ML (ref ?–0.05)
VENTRICULAR RATE: 109 BPM
WBC # BLD AUTO: 17.4 X10(3) UL (ref 4–13)

## 2018-09-21 PROCEDURE — 71045 X-RAY EXAM CHEST 1 VIEW: CPT | Performed by: EMERGENCY MEDICINE

## 2018-09-21 PROCEDURE — 70450 CT HEAD/BRAIN W/O DYE: CPT | Performed by: EMERGENCY MEDICINE

## 2018-09-21 PROCEDURE — 99223 1ST HOSP IP/OBS HIGH 75: CPT | Performed by: HOSPITALIST

## 2018-09-21 PROCEDURE — 99223 1ST HOSP IP/OBS HIGH 75: CPT | Performed by: OTHER

## 2018-09-21 RX ORDER — LORAZEPAM 2 MG/ML
0.5 CONCENTRATE ORAL EVERY 4 HOURS PRN
Status: ON HOLD | COMMUNITY
End: 2018-10-26

## 2018-09-21 RX ORDER — IPRATROPIUM BROMIDE AND ALBUTEROL SULFATE 2.5; .5 MG/3ML; MG/3ML
3 SOLUTION RESPIRATORY (INHALATION)
Status: DISCONTINUED | OUTPATIENT
Start: 2018-09-21 | End: 2018-09-22

## 2018-09-21 RX ORDER — ONDANSETRON 2 MG/ML
4 INJECTION INTRAMUSCULAR; INTRAVENOUS EVERY 4 HOURS PRN
Status: DISCONTINUED | OUTPATIENT
Start: 2018-09-21 | End: 2018-09-23

## 2018-09-21 RX ORDER — METOPROLOL TARTRATE 50 MG/1
100 TABLET, FILM COATED ORAL ONCE
Status: COMPLETED | OUTPATIENT
Start: 2018-09-21 | End: 2018-09-21

## 2018-09-21 RX ORDER — METOPROLOL SUCCINATE 100 MG/1
100 TABLET, EXTENDED RELEASE ORAL DAILY
Status: DISCONTINUED | OUTPATIENT
Start: 2018-09-22 | End: 2018-09-23

## 2018-09-21 RX ORDER — INSULIN ASPART 100 [IU]/ML
25 INJECTION, SOLUTION INTRAVENOUS; SUBCUTANEOUS
Status: ON HOLD | COMMUNITY
End: 2018-10-26

## 2018-09-21 RX ORDER — DILTIAZEM HYDROCHLORIDE 180 MG/1
180 CAPSULE, EXTENDED RELEASE ORAL ONCE
Status: COMPLETED | OUTPATIENT
Start: 2018-09-21 | End: 2018-09-21

## 2018-09-21 RX ORDER — OXYBUTYNIN CHLORIDE 10 MG/1
10 TABLET, EXTENDED RELEASE ORAL DAILY
Status: DISCONTINUED | OUTPATIENT
Start: 2018-09-22 | End: 2018-09-23

## 2018-09-21 RX ORDER — PREDNISONE 10 MG/1
10 TABLET ORAL DAILY
Status: DISCONTINUED | OUTPATIENT
Start: 2018-09-22 | End: 2018-09-23

## 2018-09-21 RX ORDER — ONDANSETRON 4 MG/1
4 TABLET, FILM COATED ORAL EVERY 8 HOURS PRN
COMMUNITY

## 2018-09-21 RX ORDER — ASPIRIN AND DIPYRIDAMOLE 25; 200 MG/1; MG/1
1 CAPSULE, EXTENDED RELEASE ORAL 2 TIMES DAILY
Status: DISCONTINUED | OUTPATIENT
Start: 2018-09-21 | End: 2018-09-23

## 2018-09-21 RX ORDER — PREDNISONE 10 MG/1
10 TABLET ORAL DAILY
Status: ON HOLD | COMMUNITY
End: 2018-10-26

## 2018-09-21 RX ORDER — FUROSEMIDE 10 MG/ML
40 INJECTION INTRAMUSCULAR; INTRAVENOUS DAILY
Status: DISCONTINUED | OUTPATIENT
Start: 2018-09-21 | End: 2018-09-23

## 2018-09-21 RX ORDER — METOCLOPRAMIDE HYDROCHLORIDE 5 MG/ML
10 INJECTION INTRAMUSCULAR; INTRAVENOUS EVERY 8 HOURS PRN
Status: DISCONTINUED | OUTPATIENT
Start: 2018-09-21 | End: 2018-09-23

## 2018-09-21 RX ORDER — METHYLPREDNISOLONE SODIUM SUCCINATE 40 MG/ML
40 INJECTION, POWDER, LYOPHILIZED, FOR SOLUTION INTRAMUSCULAR; INTRAVENOUS ONCE
Status: COMPLETED | OUTPATIENT
Start: 2018-09-21 | End: 2018-09-21

## 2018-09-21 RX ORDER — ACETAMINOPHEN 650 MG/1
650 SUPPOSITORY RECTAL EVERY 4 HOURS PRN
COMMUNITY

## 2018-09-21 RX ORDER — DILTIAZEM HYDROCHLORIDE 180 MG/1
180 CAPSULE, EXTENDED RELEASE ORAL DAILY
Status: DISCONTINUED | OUTPATIENT
Start: 2018-09-22 | End: 2018-09-23

## 2018-09-21 RX ORDER — MORPHINE SULFATE 20 MG/ML
5 SOLUTION ORAL 2 TIMES DAILY PRN
Status: ON HOLD | COMMUNITY
End: 2018-10-26

## 2018-09-21 RX ORDER — POLYETHYLENE GLYCOL 3350 17 G/17G
17 POWDER, FOR SOLUTION ORAL 2 TIMES DAILY
Status: DISCONTINUED | OUTPATIENT
Start: 2018-09-21 | End: 2018-09-23

## 2018-09-21 RX ORDER — HYDROCODONE BITARTRATE AND ACETAMINOPHEN 5; 325 MG/1; MG/1
1 TABLET ORAL EVERY 6 HOURS PRN
Status: DISCONTINUED | OUTPATIENT
Start: 2018-09-21 | End: 2018-09-23

## 2018-09-21 RX ORDER — CETIRIZINE HYDROCHLORIDE 10 MG/1
10 TABLET ORAL DAILY
Status: DISCONTINUED | OUTPATIENT
Start: 2018-09-22 | End: 2018-09-23

## 2018-09-21 RX ORDER — LORAZEPAM 2 MG/ML
0.5 CONCENTRATE ORAL EVERY 4 HOURS PRN
Status: DISCONTINUED | OUTPATIENT
Start: 2018-09-21 | End: 2018-09-23

## 2018-09-21 RX ORDER — ZINC OXIDE
1 OINTMENT (GRAM) TOPICAL AS NEEDED
COMMUNITY

## 2018-09-21 RX ORDER — ACETAMINOPHEN 650 MG/1
650 SUPPOSITORY RECTAL EVERY 4 HOURS PRN
Status: DISCONTINUED | OUTPATIENT
Start: 2018-09-21 | End: 2018-09-23

## 2018-09-21 RX ORDER — SODIUM CHLORIDE 9 MG/ML
INJECTION, SOLUTION INTRAVENOUS CONTINUOUS
Status: DISCONTINUED | OUTPATIENT
Start: 2018-09-21 | End: 2018-09-21

## 2018-09-21 RX ORDER — ONDANSETRON 2 MG/ML
4 INJECTION INTRAMUSCULAR; INTRAVENOUS EVERY 6 HOURS PRN
Status: DISCONTINUED | OUTPATIENT
Start: 2018-09-21 | End: 2018-09-23

## 2018-09-21 RX ORDER — CHLORHEXIDINE GLUCONATE 0.12 MG/ML
15 RINSE ORAL 2 TIMES DAILY
COMMUNITY

## 2018-09-21 RX ORDER — KETOTIFEN FUMARATE 0.35 MG/ML
2 SOLUTION/ DROPS OPHTHALMIC 2 TIMES DAILY PRN
Status: DISCONTINUED | OUTPATIENT
Start: 2018-09-21 | End: 2018-09-23

## 2018-09-21 RX ORDER — INSULIN ASPART 100 [IU]/ML
12 INJECTION, SOLUTION INTRAVENOUS; SUBCUTANEOUS
Status: DISCONTINUED | OUTPATIENT
Start: 2018-09-21 | End: 2018-09-22 | Stop reason: SDUPTHER

## 2018-09-21 RX ORDER — ACETAMINOPHEN 325 MG/1
650 TABLET ORAL EVERY 6 HOURS PRN
Status: DISCONTINUED | OUTPATIENT
Start: 2018-09-21 | End: 2018-09-23

## 2018-09-21 RX ORDER — MORPHINE SULFATE 20 MG/ML
5 SOLUTION ORAL 2 TIMES DAILY PRN
Status: DISCONTINUED | OUTPATIENT
Start: 2018-09-21 | End: 2018-09-23

## 2018-09-21 RX ORDER — FUROSEMIDE 40 MG/1
40 TABLET ORAL DAILY
Status: DISCONTINUED | OUTPATIENT
Start: 2018-09-22 | End: 2018-09-21

## 2018-09-21 RX ORDER — NYSTATIN 100000 [USP'U]/G
1 POWDER TOPICAL 2 TIMES DAILY
COMMUNITY

## 2018-09-21 RX ORDER — SODIUM CHLORIDE 9 MG/ML
125 INJECTION, SOLUTION INTRAVENOUS CONTINUOUS
Status: DISCONTINUED | OUTPATIENT
Start: 2018-09-21 | End: 2018-09-21

## 2018-09-21 NOTE — ED INITIAL ASSESSMENT (HPI)
Pt arrives from NH with slurred speech and right  sided facial droop this morning. Last seen normal at 0900 today.

## 2018-09-21 NOTE — CONSULTS
Jes[address]27  Neurology Consult  2018, 5:03 PM     [address] [facility] Patient Status:  Inpatient    1924 MRN [id number]   [facility] 7NE-A Attending Antoni Ferguson MD   Hosp Day # 0 PCP Christophe Hamilton MD SPECIMEN 1 SITE RIGHT  No date: OTHER SURGICAL HISTORY; Bilateral      Comment:  knee replacement  No date: OTHER SURGICAL HISTORY      Comment:  colon resection  No date: OTHER SURGICAL HISTORY      Comment:  hernia repair  No date: OTHER SURGICAL HISTORY (three) times daily before meals. Disp:  Rfl:  9/20/2018 at 1800   Nystatin 373992 UNIT/GM External Powder Apply 1 Application topically 2 (two) times daily.  Disp:  Rfl:  9/20/2018 at Unknown time   Ondansetron HCl (ZOFRAN) 4 mg tablet Take 4 mg by mouth e insulin glargine (LANTUS) 100 UNIT/ML Subcutaneous Solution Inject 40 Units into the skin 2 (two) times daily. Disp:  Rfl:  9/21/2018 at 0900   Polyethylene Glycol 3350 (MIRALAX) Oral Powder Take 17 g by mouth 2 (two) times daily.  Disp: 255 g Rfl: 0 9/ BUN  13   CREATSERUM  0.64   GFRAA  88   GFRNAA  77   CA  9.3   ALB  3.0*   NA  140   K  3.8   CL  101   CO2  33.0*   ALKPHO  99   AST  10*   ALT  13*   BILT  0.6   TP  6.6       Lab Results   Component Value Date    PT 13.0 06/14/2014    PT 12.5 10/20/2

## 2018-09-21 NOTE — H&P
LILI HOSPITALIST  History and Physical     Tiffanie Barnes Patient Status:  Inpatient    1924 MRN [id number]   [facility] 7NE-A Attending Charlotte Goldsmith MD   Hosp Day # 0 PCP Boom Villarreal MD     Chief Complaint: R sided weakne OTHER SURGICAL HISTORY      Comment:  hernia repair  No date: OTHER SURGICAL HISTORY      Comment:  femur fx repair  No date: REPAIR ING HERNIA,5+Y/O,REDUCIBL    Social History:  reports that she quit smoking about 29 years ago.  Her smoking use included ci Take 4 mg by mouth every 8 (eight) hours as needed for Nausea. Disp:  Rfl:    predniSONE 10 MG Oral Tab Take 10 mg by mouth daily. Disp:  Rfl:    Probiotic Product (RISAQUAD) Oral Cap Take 1 capsule by mouth daily.  Disp:  Rfl:    Ketotifen Fumarate (EYE IT Pulse 109   Temp 98 °F (36.7 °C) (Oral)   Resp 20   Wt 200 lb (90.7 kg)   LMP  (LMP Unknown)   SpO2 94%   BMI 31.32 kg/m²   General: No acute distress. Alert and confused  HEENT: Normocephalic atraumatic. Moist mucous membranes. EOM-I. PERRLA. Anicteric. neuro    Quality:  · DVT Prophylaxis: scd, eliquis  · CODE status: DNR  · Sandoval: no    Plan of care discussed with pt, ed physician, rn, neuro    Cooper Sebastian MD  8/06/9420          **Certification      PHYSICIAN Certification of Need for Inpatient Hospit

## 2018-09-21 NOTE — ED PROVIDER NOTES
Patient Seen in: [facility] Emergency Department    History   Patient presents with:  Stroke (neurologic)    Stated Complaint:     HPI    51-year-old female who resides in an assisted living facility was brought to the emergency department today bec SITE RIGHT  No date: OTHER SURGICAL HISTORY; Bilateral      Comment:  knee replacement  No date: OTHER SURGICAL HISTORY      Comment:  colon resection  No date: OTHER SURGICAL HISTORY      Comment:  hernia repair  No date: OTHER SURGICAL HISTORY      Comme Skin is dry without rashes or lesions. Neuro exam: Alert and oriented ×4. Speech is occasionally slurred but is generally understandable. There is a subtle right pronator drift.   No discernible difference in motor strength of the lower extremities was n number is likely lower. Patient is not a candidate for thrombolytics because she is already taking Eliquis. Ct Brain Or Head (44476)    Result Date: 9/21/2018  CONCLUSION:  1. No acute intracranial hemorrhage.   2. Findings most consistent with chronic file for this visit. There is no disposition time on file for this visit. Follow-up:  No follow-up provider specified.       Medications Prescribed:  Current Discharge Medication List

## 2018-09-21 NOTE — ED NOTES
Attempted to cath patient, who is uncooperative. Rekha area excoriated with white discharge. Difficulty obtaining urine. While cleaning patient and attempting to cath, began yelling. Daughter at bedside. States, \"That's it. You're done. \"  New brief

## 2018-09-22 ENCOUNTER — APPOINTMENT (OUTPATIENT)
Dept: CV DIAGNOSTICS | Facility: HOSPITAL | Age: 83
DRG: 690 | End: 2018-09-22
Attending: HOSPITALIST
Payer: MEDICARE

## 2018-09-22 LAB
ALBUMIN SERPL-MCNC: 2.5 G/DL (ref 3.5–4.8)
ALBUMIN/GLOB SERPL: 0.7 {RATIO} (ref 1–2)
ALP LIVER SERPL-CCNC: 81 U/L (ref 55–142)
ALT SERPL-CCNC: 13 U/L (ref 14–54)
ANION GAP SERPL CALC-SCNC: 6 MMOL/L (ref 0–18)
AST SERPL-CCNC: 13 U/L (ref 15–41)
BASOPHILS # BLD AUTO: 0.03 X10(3) UL (ref 0–0.1)
BASOPHILS NFR BLD AUTO: 0.2 %
BILIRUB SERPL-MCNC: 0.5 MG/DL (ref 0.1–2)
BUN BLD-MCNC: 14 MG/DL (ref 8–20)
BUN/CREAT SERPL: 25 (ref 10–20)
CALCIUM BLD-MCNC: 8.8 MG/DL (ref 8.3–10.3)
CHLORIDE SERPL-SCNC: 106 MMOL/L (ref 101–111)
CO2 SERPL-SCNC: 30 MMOL/L (ref 22–32)
CREAT BLD-MCNC: 0.56 MG/DL (ref 0.55–1.02)
EOSINOPHIL # BLD AUTO: 0 X10(3) UL (ref 0–0.3)
EOSINOPHIL NFR BLD AUTO: 0 %
ERYTHROCYTE [DISTWIDTH] IN BLOOD BY AUTOMATED COUNT: 14.6 % (ref 11.5–16)
GLOBULIN PLAS-MCNC: 3.6 G/DL (ref 2.5–4)
GLUCOSE BLD-MCNC: 139 MG/DL (ref 65–99)
GLUCOSE BLD-MCNC: 145 MG/DL (ref 65–99)
GLUCOSE BLD-MCNC: 162 MG/DL (ref 65–99)
GLUCOSE BLD-MCNC: 166 MG/DL (ref 70–99)
GLUCOSE BLD-MCNC: 183 MG/DL (ref 65–99)
GLUCOSE BLD-MCNC: 214 MG/DL (ref 65–99)
HAV IGM SER QL: 1.9 MG/DL (ref 1.8–2.5)
HCT VFR BLD AUTO: 37.8 % (ref 34–50)
HGB BLD-MCNC: 11.9 G/DL (ref 12–16)
IMMATURE GRANULOCYTE COUNT: 0.07 X10(3) UL (ref 0–1)
IMMATURE GRANULOCYTE RATIO %: 0.5 %
LYMPHOCYTES # BLD AUTO: 1.95 X10(3) UL (ref 0.9–4)
LYMPHOCYTES NFR BLD AUTO: 13.6 %
M PROTEIN MFR SERPL ELPH: 6.1 G/DL (ref 6.1–8.3)
MCH RBC QN AUTO: 29.8 PG (ref 27–33.2)
MCHC RBC AUTO-ENTMCNC: 31.5 G/DL (ref 31–37)
MCV RBC AUTO: 94.5 FL (ref 81–100)
MONOCYTES # BLD AUTO: 0.86 X10(3) UL (ref 0.1–1)
MONOCYTES NFR BLD AUTO: 6 %
NEUTROPHIL ABS PRELIM: 11.48 X10 (3) UL (ref 1.3–6.7)
NEUTROPHILS # BLD AUTO: 11.48 X10(3) UL (ref 1.3–6.7)
NEUTROPHILS NFR BLD AUTO: 79.7 %
OSMOLALITY SERPL CALC.SUM OF ELEC: 298 MOSM/KG (ref 275–295)
PLATELET # BLD AUTO: 333 10(3)UL (ref 150–450)
POTASSIUM SERPL-SCNC: 3.8 MMOL/L (ref 3.6–5.1)
RBC # BLD AUTO: 4 X10(6)UL (ref 3.8–5.1)
RED CELL DISTRIBUTION WIDTH-SD: 50.4 FL (ref 35.1–46.3)
SODIUM SERPL-SCNC: 142 MMOL/L (ref 136–144)
WBC # BLD AUTO: 14.4 X10(3) UL (ref 4–13)

## 2018-09-22 PROCEDURE — 93306 TTE W/DOPPLER COMPLETE: CPT | Performed by: HOSPITALIST

## 2018-09-22 PROCEDURE — 99232 SBSQ HOSP IP/OBS MODERATE 35: CPT | Performed by: HOSPITALIST

## 2018-09-22 PROCEDURE — 99233 SBSQ HOSP IP/OBS HIGH 50: CPT | Performed by: OTHER

## 2018-09-22 PROCEDURE — 95816 EEG AWAKE AND DROWSY: CPT | Performed by: OTHER

## 2018-09-22 RX ORDER — IPRATROPIUM BROMIDE AND ALBUTEROL SULFATE 2.5; .5 MG/3ML; MG/3ML
3 SOLUTION RESPIRATORY (INHALATION)
Status: DISCONTINUED | OUTPATIENT
Start: 2018-09-22 | End: 2018-09-23

## 2018-09-22 RX ORDER — POTASSIUM CHLORIDE 20 MEQ/1
40 TABLET, EXTENDED RELEASE ORAL ONCE
Status: COMPLETED | OUTPATIENT
Start: 2018-09-22 | End: 2018-09-22

## 2018-09-22 RX ORDER — LEVETIRACETAM 250 MG/1
250 TABLET ORAL 2 TIMES DAILY
Status: DISCONTINUED | OUTPATIENT
Start: 2018-09-22 | End: 2018-09-23

## 2018-09-22 NOTE — SLP NOTE
ADULT SWALLOWING EVALUATION    ASSESSMENT    ASSESSMENT/OVERALL IMPRESSION:  Pt seen this morning for BSSE, per nurse did not pass RN dysphagia screening and subsequently made NPO.  Pt is a 81 y/o F who was admitted to [facility] on 9/21/18 due slurred understanding/implementation of aspiration precautions/compensatory strategies. If CXR declines or pt begins demonstrating clinical s/s of aspiration, a VFSS may be performed at that time.      RECOMMENDATIONS   Diet Recommendations - Solids: Regular  Diet mastication with complete and timely clearing of the oral cavity. Pharyngeal phase revealed an apparent timely initiation of the pharyngeal swallow with functional hyolaryngeal elevation on palpation.  No coughing or throat clearing following po, vocal qual implementation of aspiration precautions and swallow strategies independently over 2 session(s).     New Goal     FOLLOW UP  Treatment Plan/Recommendations: Dysphagia therapy  Number of Visits to Meet Established Goals: 2  Follow Up Needed: Yes  SLP Follow-

## 2018-09-22 NOTE — PROGRESS NOTES
Assumed pt care at 0730  Pt a & o x 2 confused vss on 2L nc  unable to wean to room air   nsr on tele   Neuro checks q4h   Right sided weakness, slurred speech and right sided facial droop  EEG and 2D echo completed   Pt tolerating diet and medication g

## 2018-09-22 NOTE — PROGRESS NOTES
LILI HOSPITALIST  Progress Note     Jerry Spindle Patient Status:  Inpatient    1924 MRN [id number]   [facility] 7NE-A Attending Refmary Manrique MD   Hosp Day # 1 PCP Meliton Jerez MD     Chief Complaint: slurred speech    S: Pa Subcutaneous Q12H   • PEG 3350  17 g Oral BID   • predniSONE  10 mg Oral Daily   • Oxybutynin Chloride ER  10 mg Oral Daily   • furosemide  40 mg Intravenous Daily   • Aspirin-Dipyridamole ER  1 capsule Oral BID   • apixaban  2.5 mg Oral BID   • DilTIAZem

## 2018-09-22 NOTE — PROGRESS NOTES
[id number] [facility] Neurology Progress Note    Celina Zuluaga Patient Status:  Inpatient    1924 MRN [id number]   [place] [facility] 7NE-A Attending Gage Mccray MD   Hosp Day # 1 PCP Em Coello MD     CC: Slurred speech    [facility] CO2 30.0 09/22/2018     09/22/2018    CA 8.8 09/22/2018    ALB 2.5 09/22/2018    ALKPHO 81 09/22/2018    BILT 0.5 09/22/2018    TP 6.1 09/22/2018    AST 13 09/22/2018    ALT 13 09/22/2018    PTT 29.2 09/21/2018    INR 1.04 09/21/2018    PTP 14.0 2  (   ) Fam meetings - patient not present --non F2F  Non Face to Face CPT code 17889/95271 applies as documented above    High risk    (   ) Drug monitoring    (   ) PCA    (   ) Organ failure    (   ) De-escalation of treatment - DNR    (   ) Acute neur

## 2018-09-22 NOTE — PROCEDURES
[address]  in affiliation with [facility]        Name: Dacia Pizarro  1/8/1924  Date of Study: 9/22/2018    ELECTROENCEPHALOGRAPHY     Ordering Physician:   Adrian Antonio Rfl:    HYDROcodone-acetaminophen 5-325 MG Oral Tab Take 1 tablet by mouth every 6 (six) hours as needed for Pain. Disp:  Rfl:    Fexofenadine HCl (HM FEXOFENADINE HCL) 180 MG Oral Tab Take 180 mg by mouth daily.  Disp:  Rfl:    furosemide 40 MG Oral Tab Ta focal abnormalities seen, and there were no epileptiform discharges. There were no abnormal paroxysmal discharges. Impression:  Abnormal EEG due to diffuse slowing consistent with encephalopathy from any cause.             Pramod Boggs MD  Vascul

## 2018-09-22 NOTE — PLAN OF CARE
Assumed care at 1900  AOx2-3, forgetful/confused at times  No stroke protocol ordered  Neuro checks not ordered  R sided weakness, bilateral drift, slight R facial droop  Speech slurred  3-4L of O2 overnight, ST via tele  IV lasix given  NPO, speech to see

## 2018-09-22 NOTE — SLP NOTE
SLP attempted to see pt for BSSE this morning. Pt occupied, receiving echo. Will re-attempt later today as schedule permits.

## 2018-09-23 VITALS
SYSTOLIC BLOOD PRESSURE: 128 MMHG | DIASTOLIC BLOOD PRESSURE: 58 MMHG | OXYGEN SATURATION: 96 % | HEART RATE: 89 BPM | TEMPERATURE: 99 F | BODY MASS INDEX: 31 KG/M2 | WEIGHT: 200 LBS | RESPIRATION RATE: 24 BRPM

## 2018-09-23 LAB
ERYTHROCYTE [DISTWIDTH] IN BLOOD BY AUTOMATED COUNT: 14.6 % (ref 11.5–16)
GLUCOSE BLD-MCNC: 135 MG/DL (ref 65–99)
GLUCOSE BLD-MCNC: 151 MG/DL (ref 65–99)
GLUCOSE BLD-MCNC: 180 MG/DL (ref 65–99)
GLUCOSE BLD-MCNC: 208 MG/DL (ref 65–99)
GLUCOSE BLD-MCNC: 61 MG/DL (ref 65–99)
GLUCOSE BLD-MCNC: 68 MG/DL (ref 65–99)
HCT VFR BLD AUTO: 34.9 % (ref 34–50)
HGB BLD-MCNC: 10.7 G/DL (ref 12–16)
MCH RBC QN AUTO: 29.2 PG (ref 27–33.2)
MCHC RBC AUTO-ENTMCNC: 30.7 G/DL (ref 31–37)
MCV RBC AUTO: 95.1 FL (ref 81–100)
PLATELET # BLD AUTO: 319 10(3)UL (ref 150–450)
POTASSIUM SERPL-SCNC: 3.8 MMOL/L (ref 3.6–5.1)
RBC # BLD AUTO: 3.67 X10(6)UL (ref 3.8–5.1)
RED CELL DISTRIBUTION WIDTH-SD: 50.7 FL (ref 35.1–46.3)
WBC # BLD AUTO: 9.1 X10(3) UL (ref 4–13)

## 2018-09-23 PROCEDURE — 99239 HOSP IP/OBS DSCHRG MGMT >30: CPT | Performed by: HOSPITALIST

## 2018-09-23 PROCEDURE — 99233 SBSQ HOSP IP/OBS HIGH 50: CPT | Performed by: OTHER

## 2018-09-23 RX ORDER — DEXTROSE MONOHYDRATE 25 G/50ML
INJECTION, SOLUTION INTRAVENOUS
Status: COMPLETED
Start: 2018-09-23 | End: 2018-09-23

## 2018-09-23 RX ORDER — CEPHALEXIN 500 MG/1
500 CAPSULE ORAL 4 TIMES DAILY
Qty: 20 CAPSULE | Refills: 0 | Status: SHIPPED | OUTPATIENT
Start: 2018-09-23 | End: 2018-09-28

## 2018-09-23 RX ORDER — LEVETIRACETAM 250 MG/1
250 TABLET ORAL 2 TIMES DAILY
Qty: 60 TABLET | Refills: 5 | Status: SHIPPED | OUTPATIENT
Start: 2018-09-23

## 2018-09-23 RX ORDER — POTASSIUM CHLORIDE 20 MEQ/1
40 TABLET, EXTENDED RELEASE ORAL ONCE
Status: DISCONTINUED | OUTPATIENT
Start: 2018-09-23 | End: 2018-09-23

## 2018-09-23 RX ORDER — POTASSIUM CHLORIDE 20 MEQ/1
20 TABLET, EXTENDED RELEASE ORAL DAILY
Status: DISCONTINUED | OUTPATIENT
Start: 2018-09-23 | End: 2018-09-23

## 2018-09-23 NOTE — PROGRESS NOTES
[id number] [facility] Neurology Progress Note    Shawna Oz Patient Status:  Inpatient    1924 MRN [id number]   [facility] 7NE-A Attending Cristopher Sousa MD   Hosp Day # 2 PCP Sania Horan MD     CC: Slurred speech    [facility] antibiotics for likely UTI  · A-fib: continue JARRELL Parada [facility]  Spectra [id number]  Pager [phone]  9/23/2018, 10:31 AM       NEUROLOGY Attending notes:    I have seen and evaluated the patient with my housestaff or in above Exam findings. A/P:  Recurrent stereotypic TIA like events with no documentation of ischemic events.   Therefore we are pursuing the past along the line of probable seizures and as stated above we will see her in 3 weeks to see that she is tolerati

## 2018-09-23 NOTE — PROGRESS NOTES
09/22/18 190   Clinical Encounter Type   Visited With Patient and family together  (Daughter, DIL and grandaughter at bedside)   Routine Visit Introduction  (POLST request/completed)   Continue Visiting No  (FAMILY WAS VERY ADAMANT: DO NOT SEND THE KATHRYN

## 2018-09-23 NOTE — SLP NOTE
SPEECH DAILY NOTE - INPATIENT    ASSESSMENT & PLAN   ASSESSMENT  Pt seen for meal assess/dysphagia therapy to monitor po tolerance of recommended diet and ensure adherence to aspiration precautions. Pt alert and sitting upright in bed.   Pt tolerated lunch

## 2018-09-23 NOTE — DISCHARGE SUMMARY
LILI HOSPITALIST  DISCHARGE SUMMARY     [facility]:  [facility]    1924 MRN [id number]   [facility] 7NE-A Attending Daina Guzman MD   [facility] Day # 2 PCP More Chen MD     Date of Admission: 2018  Date o Will continue back on her normal lasix dose. If sx's recur, could consider increasing lasix as an outpt. She will f/u w/ PCP this week. Of note, BS somewhat low during her stay, may be due to different diet while inpatient.  Recommended decreasing levemir d Tabs      Take 1 tablet by mouth daily. Refills:  0     DilTIAZem HCl ER Coated Beads 180 MG Cp24  Commonly known as:  CARDIZEM CD      Take 1 capsule by mouth daily.    Quantity:  30 capsule  Refills:  3     EYE ITCH RELIEF OP      Place 1 drop into both needed. Refills:  0     Tolterodine Tartrate ER 4 MG Cp24  Commonly known as:  DETROL LA      Take 4 mg by mouth daily. Refills:  0     TOPROL  MG Tb24  Generic drug:  Metoprolol Succinate ER      Take 100 mg by mouth daily.    Refills:  0     Vit

## 2018-09-23 NOTE — PLAN OF CARE
Assumed care @ 0700. A&Ox3-4, VSS, NSR on tele  Received pt on 1L NC. Weaned to RA. Lung sounds with wheezes. Non prod cough noted  No sz activity  Blood glucose = 61. Treated per protocol.  Dr. Eric Neither notified        NURSING DISCHARGE NOTE    Discharged

## 2018-09-23 NOTE — CM/SW NOTE
piter notified that pt can discharge today back to [facility]. piter spoke to pts RN at [facility] and confirmed pt can return. RN to call report to [phone]. dtr requesting transportation. piter arranged [address] ambulance 3pm.  PCS form completed. RN informed.

## 2018-09-23 NOTE — PLAN OF CARE
Assumed care at 1900  AOx2-3, forgetful/confused at times  Irritable tonight, refusing to take some medications - irritability resolved  3-4L of O2 overnight, SR via tele  Patient briefed, incontinent  Rekha area reddened  Patient denies pain or nausea  VSS

## 2018-09-23 NOTE — PLAN OF CARE
Problem: Impaired Swallowing  Goal: Minimize aspiration risk  Interventions:  - Patient should be alert and upright for all feedings (90 degrees preferred)  - Offer food and liquids at a slow rate  - Small single sips via straw ok.   D/C if csa  - Encourage

## 2018-09-24 NOTE — CM/SW NOTE
09/24/18 0900   Discharge disposition   Expected discharge disposition [facility]   Name of [address]   Discharge transportation [employer]

## 2018-10-15 ENCOUNTER — LAB ENCOUNTER (OUTPATIENT)
Dept: LAB | Age: 83
End: 2018-10-15
Attending: FAMILY MEDICINE
Payer: MEDICARE

## 2018-10-15 DIAGNOSIS — R42 VERTIGO: ICD-10-CM

## 2018-10-15 DIAGNOSIS — R53.1 WEAKNESS: ICD-10-CM

## 2018-10-15 DIAGNOSIS — I10 HTN (HYPERTENSION): ICD-10-CM

## 2018-10-15 DIAGNOSIS — N39.0 UTI (URINARY TRACT INFECTION): Primary | ICD-10-CM

## 2018-10-15 PROCEDURE — 87186 SC STD MICRODIL/AGAR DIL: CPT

## 2018-10-15 PROCEDURE — 87086 URINE CULTURE/COLONY COUNT: CPT

## 2018-10-15 PROCEDURE — 87077 CULTURE AEROBIC IDENTIFY: CPT

## 2018-10-15 PROCEDURE — 81001 URINALYSIS AUTO W/SCOPE: CPT

## 2018-10-19 ENCOUNTER — HOSPITAL ENCOUNTER (INPATIENT)
Facility: HOSPITAL | Age: 83
LOS: 5 days | Discharge: HOME HEALTH | DRG: 189 | End: 2018-10-26
Attending: EMERGENCY MEDICINE | Admitting: HOSPITALIST
Payer: MEDICARE

## 2018-10-19 ENCOUNTER — APPOINTMENT (OUTPATIENT)
Dept: GENERAL RADIOLOGY | Facility: HOSPITAL | Age: 83
DRG: 189 | End: 2018-10-19
Payer: MEDICARE

## 2018-10-19 DIAGNOSIS — J98.01 ACUTE BRONCHOSPASM: Primary | ICD-10-CM

## 2018-10-19 PROCEDURE — 99223 1ST HOSP IP/OBS HIGH 75: CPT | Performed by: HOSPITALIST

## 2018-10-19 PROCEDURE — 71045 X-RAY EXAM CHEST 1 VIEW: CPT | Performed by: EMERGENCY MEDICINE

## 2018-10-19 RX ORDER — IPRATROPIUM BROMIDE AND ALBUTEROL SULFATE 2.5; .5 MG/3ML; MG/3ML
3 SOLUTION RESPIRATORY (INHALATION) ONCE
Status: COMPLETED | OUTPATIENT
Start: 2018-10-19 | End: 2018-10-19

## 2018-10-19 RX ORDER — METHYLPREDNISOLONE SODIUM SUCCINATE 125 MG/2ML
125 INJECTION, POWDER, LYOPHILIZED, FOR SOLUTION INTRAMUSCULAR; INTRAVENOUS ONCE
Status: COMPLETED | OUTPATIENT
Start: 2018-10-19 | End: 2018-10-19

## 2018-10-20 ENCOUNTER — APPOINTMENT (OUTPATIENT)
Dept: GENERAL RADIOLOGY | Facility: HOSPITAL | Age: 83
DRG: 189 | End: 2018-10-20
Attending: HOSPITALIST
Payer: MEDICARE

## 2018-10-20 PROCEDURE — 71045 X-RAY EXAM CHEST 1 VIEW: CPT | Performed by: HOSPITALIST

## 2018-10-20 PROCEDURE — 99233 SBSQ HOSP IP/OBS HIGH 50: CPT | Performed by: HOSPITALIST

## 2018-10-20 RX ORDER — AZITHROMYCIN 250 MG/1
500 TABLET, FILM COATED ORAL
Status: DISPENSED | OUTPATIENT
Start: 2018-10-20 | End: 2018-10-22

## 2018-10-20 RX ORDER — ACETAMINOPHEN 325 MG/1
650 TABLET ORAL EVERY 6 HOURS PRN
Status: DISCONTINUED | OUTPATIENT
Start: 2018-10-20 | End: 2018-10-26

## 2018-10-20 RX ORDER — IPRATROPIUM BROMIDE AND ALBUTEROL SULFATE 2.5; .5 MG/3ML; MG/3ML
3 SOLUTION RESPIRATORY (INHALATION)
Status: DISCONTINUED | OUTPATIENT
Start: 2018-10-20 | End: 2018-10-20

## 2018-10-20 RX ORDER — CHLORHEXIDINE GLUCONATE 0.12 MG/ML
15 RINSE ORAL 2 TIMES DAILY
Status: DISCONTINUED | OUTPATIENT
Start: 2018-10-20 | End: 2018-10-26

## 2018-10-20 RX ORDER — ASPIRIN AND DIPYRIDAMOLE 25; 200 MG/1; MG/1
1 CAPSULE, EXTENDED RELEASE ORAL 2 TIMES DAILY
Status: DISCONTINUED | OUTPATIENT
Start: 2018-10-20 | End: 2018-10-26

## 2018-10-20 RX ORDER — CODEINE PHOSPHATE AND GUAIFENESIN 10; 100 MG/5ML; MG/5ML
5 SOLUTION ORAL EVERY 4 HOURS PRN
Status: DISCONTINUED | OUTPATIENT
Start: 2018-10-20 | End: 2018-10-26

## 2018-10-20 RX ORDER — POTASSIUM CHLORIDE 20 MEQ/1
20 TABLET, EXTENDED RELEASE ORAL DAILY
Status: DISCONTINUED | OUTPATIENT
Start: 2018-10-20 | End: 2018-10-26

## 2018-10-20 RX ORDER — PREDNISONE 20 MG/1
60 TABLET ORAL DAILY
Status: DISCONTINUED | OUTPATIENT
Start: 2018-10-20 | End: 2018-10-21

## 2018-10-20 RX ORDER — LEVETIRACETAM 250 MG/1
250 TABLET ORAL 2 TIMES DAILY
Status: DISCONTINUED | OUTPATIENT
Start: 2018-10-20 | End: 2018-10-22

## 2018-10-20 RX ORDER — DILTIAZEM HYDROCHLORIDE 180 MG/1
180 CAPSULE, EXTENDED RELEASE ORAL DAILY
Status: DISCONTINUED | OUTPATIENT
Start: 2018-10-20 | End: 2018-10-22

## 2018-10-20 RX ORDER — MORPHINE SULFATE 20 MG/ML
5 SOLUTION ORAL 2 TIMES DAILY PRN
Status: DISCONTINUED | OUTPATIENT
Start: 2018-10-20 | End: 2018-10-26

## 2018-10-20 RX ORDER — METOPROLOL SUCCINATE 50 MG/1
100 TABLET, EXTENDED RELEASE ORAL
Status: DISCONTINUED | OUTPATIENT
Start: 2018-10-20 | End: 2018-10-22

## 2018-10-20 RX ORDER — SULFAMETHOXAZOLE AND TRIMETHOPRIM 800; 160 MG/1; MG/1
1 TABLET ORAL EVERY 12 HOURS SCHEDULED
Status: DISCONTINUED | OUTPATIENT
Start: 2018-10-20 | End: 2018-10-20

## 2018-10-20 RX ORDER — DEXTROSE MONOHYDRATE 25 G/50ML
50 INJECTION, SOLUTION INTRAVENOUS
Status: DISCONTINUED | OUTPATIENT
Start: 2018-10-20 | End: 2018-10-26

## 2018-10-20 RX ORDER — OXYBUTYNIN CHLORIDE 10 MG/1
10 TABLET, EXTENDED RELEASE ORAL DAILY
Status: DISCONTINUED | OUTPATIENT
Start: 2018-10-20 | End: 2018-10-26

## 2018-10-20 RX ORDER — HYDROCODONE BITARTRATE AND ACETAMINOPHEN 5; 325 MG/1; MG/1
1 TABLET ORAL EVERY 6 HOURS PRN
Status: DISCONTINUED | OUTPATIENT
Start: 2018-10-20 | End: 2018-10-26

## 2018-10-20 RX ORDER — POLYETHYLENE GLYCOL 3350 17 G/17G
17 POWDER, FOR SOLUTION ORAL 2 TIMES DAILY
Status: DISCONTINUED | OUTPATIENT
Start: 2018-10-20 | End: 2018-10-26

## 2018-10-20 RX ORDER — IPRATROPIUM BROMIDE AND ALBUTEROL SULFATE 2.5; .5 MG/3ML; MG/3ML
3 SOLUTION RESPIRATORY (INHALATION)
Status: DISCONTINUED | OUTPATIENT
Start: 2018-10-20 | End: 2018-10-21

## 2018-10-20 RX ORDER — IPRATROPIUM BROMIDE AND ALBUTEROL SULFATE 2.5; .5 MG/3ML; MG/3ML
3 SOLUTION RESPIRATORY (INHALATION) EVERY 4 HOURS PRN
Status: DISCONTINUED | OUTPATIENT
Start: 2018-10-20 | End: 2018-10-26

## 2018-10-20 RX ORDER — FUROSEMIDE 40 MG/1
40 TABLET ORAL DAILY
Status: DISCONTINUED | OUTPATIENT
Start: 2018-10-20 | End: 2018-10-21

## 2018-10-20 RX ORDER — ZINC OXIDE
1 OINTMENT (GRAM) TOPICAL AS NEEDED
Status: DISCONTINUED | OUTPATIENT
Start: 2018-10-20 | End: 2018-10-26

## 2018-10-20 RX ORDER — METOCLOPRAMIDE HYDROCHLORIDE 5 MG/ML
10 INJECTION INTRAMUSCULAR; INTRAVENOUS EVERY 8 HOURS PRN
Status: DISCONTINUED | OUTPATIENT
Start: 2018-10-20 | End: 2018-10-23

## 2018-10-20 NOTE — ED NOTES
Upon re-evaluation of Pt, Pt daughter asking what time labs were drawn, EKG was performed and CXR was done.     Please note that while writing this note in the computer PT's daughter stood up behind RN and watched what as I typed above note and said, \"Real

## 2018-10-20 NOTE — ED INITIAL ASSESSMENT (HPI)
Pt c/o coughing up pink frothy sputum since 1700 this evening, as well as nausea, which PT rpt she received zofran at around 1630.  EMS rpt en route Pt temp was 99.7 and was dx w/ UTI 3-days ago and placed on bactrim

## 2018-10-20 NOTE — PROGRESS NOTES
LILI HOSPITALIST  Progress Note     Eliassharlene Oliver Patient Status:  Observation    1924 MRN [id number]   [facility] 3NE-A Attending Ayan Guadalupe MD   Hosp Day # 0 PCP Adry Jerry MD     Chief Complaint: Dyspnea    S: Called Potassium Chloride ER  20 mEq Oral Daily   • Aspirin-Dipyridamole ER  1 capsule Oral BID   • furosemide  40 mg Oral Daily   • Oxybutynin Chloride ER  10 mg Oral Daily   • Chlorhexidine Gluconate  15 mL Mouth/Throat BID   • apixaban  2.5 mg Oral BID   • lev

## 2018-10-20 NOTE — SLP NOTE
ADULT SWALLOWING EVALUATION    ASSESSMENT    ASSESSMENT/OVERALL IMPRESSION:  Order received for bedside swallow evaluation S/P suspected aspiration event earlier today. Clinical discussion held with RN pre/post session.  Medical history obtained from EMR, p Recommendations: One pill at a time; Whole in puree;Crushed in puree  Treatment Plan/Recommendations: SLP to reassess  Discharge Recommendations/Plan: Undetermined    HISTORY   MEDICAL HISTORY  Reason for Referral: R/O aspiration    Problem List  Principal liquids;Puree  Method of Presentation: Self presentation;Staff/Clinician assistance;Spoon;Cup;Single sips  Patient Positioning: Upright;Midline    Oral Phase of Swallow:  Within Functional Limits     Pharyngeal Phase of Swallow: Impaired  Laryngeal Elevatio

## 2018-10-20 NOTE — ED NOTES
Daughter came out to nurses station in an agitated manner wanting to know results and whether her Mom would be admitted, advised we were unable to make that decision because not all the tests were back at this time

## 2018-10-20 NOTE — H&P
LILI HOSPITALIST  History and Physical     Devere Barbara cherrie Patient Status:  Emergency    1924 MRN [id number]   Location [facility] Attending Gisela Talbert MD   Hosp Day # 0 PCP Kylee Hunter MD     Chief Complaint History:  reports that she quit smoking about 29 years ago. Her smoking use included cigarettes. She has a 75.00 pack-year smoking history. she has never used smokeless tobacco. She reports that she drinks alcohol. She reports that she does not use drugs. Nystatin 537665 UNIT/GM External Powder Apply 1 Application topically 2 (two) times daily. Disp:  Rfl:    Ondansetron HCl (ZOFRAN) 4 mg tablet Take 4 mg by mouth every 8 (eight) hours as needed for Nausea.  Disp:  Rfl:    predniSONE 10 MG Oral Tab Take 1 85   Temp 98 °F (36.7 °C) (Temporal)   Resp 17   Ht 170.2 cm (5' 7\")   Wt 200 lb (90.7 kg)   LMP  (LMP Unknown)   SpO2 96%   BMI 31.32 kg/m²   General: No acute distress. Alert and oriented x 3. HEENT: Normocephalic atraumatic. Moist mucous membranes.  EO of care discussed with ED physician    Laquetta Severe, MD  10/19/2018

## 2018-10-20 NOTE — ED PROVIDER NOTES
Patient Seen in: [facility] Emergency Department    History   Patient presents with:  Hemoptysis (respiratory)    Stated Complaint: pink frothy sputum cough since 1700    HPI    Patient is a 45-year-old female resident of [facility] skilled nursing assisted [place] Alcohol use: Yes      Alcohol/week: 0.0 oz      Comment: occasional once a month     Drug use: No      Review of Systems    Positive for stated complaint: pink frothy sputum cough since 1700  Other systems are as noted in HPI.   Constitutional and vital sig components:    RDW-SD 50.6 (*)     Neutrophil Absolute Prelim 7.15 (*)     Neutrophil Absolute 7.15 (*)     All other components within normal limits   MAGNESIUM - Normal   PRO BETA NATRIURETIC PEPTIDE - Normal   TROPONIN I - Normal   MAGNESIUM - Normal ---------                               -----------         ------                     CBC W/ DIFFERENTIAL[[id number]]          Abnormal            Final result                 Please view results for these tests on the individual orders.    URINALYSIS WIT Reviewed: 3/1/2017          ICD-10-CM Noted POA    Acute bronchospasm J98.01 10/19/2018 Unknown

## 2018-10-20 NOTE — CONSULTS
[facility] Patient Status:  Observation    1924 MRN [id number]   [facility] 3NE-A Attending Sami Pereyra MD   Hosp Day # 0 PCP Caridad Falcon MD     Date of Admission: 10/19/2018  8:36 PM  Admission Diagnos femur fx repair   • REPAIR ING HERNIA,5+Y/O,REDUCIBL          Shellfish-Derived P*    NAUSEA AND VOMITING  Radiology Contrast *        Comment:Pt and pt's daughter both confirm \"no history of             IV dye allergy\" and state that pt has tolerated (three) times daily before meals. Disp:  Rfl:    Nystatin 843322 UNIT/GM External Powder Apply 1 Application topically 2 (two) times daily.  Groin and tommie area  Disp:  Rfl:    Ondansetron HCl (ZOFRAN) 4 mg tablet Take 4 mg by mouth every 8 (eight) hours as 24 hr tab 100 mg, 100 mg, Oral, Daily Beta Blocker  •  PEG 3350 (MIRALAX) powder packet 17 g, 17 g, Oral, BID  •  diltiazem (CARDIZEM CD) 24 hr cap 180 mg, 180 mg, Oral, Daily  •  Potassium Chloride ER (K-DUR M20) CR tab 20 mEq, 20 mEq, Oral, Daily  •  Asp Subcutaneous, [email or web address]     REVIEW OF SYSTEMS:   As listed in HPI, otherwise ten point ROS is negative.      OBJECTIVE:  Patient Vitals for the past 24 hrs:   BP Temp Temp src Pulse Resp SpO2 Height Weight   10/20/18 0730 127/66 98.3 °F (36.8 °C) Oral 1 10/19/2018    GLU 79 10/19/2018    CA 9.5 10/19/2018     Lab Results   Component Value Date     10/19/2018    K 4.0 10/19/2018     10/19/2018    CO2 30.0 10/19/2018    BUN 14 10/19/2018    CREATSERUM 0.88 10/19/2018    GLU 79 10/19/2018    CA 9

## 2018-10-20 NOTE — PLAN OF CARE
Problem: Impaired Swallowing  Goal: Minimize aspiration risk  Recommend:  -NPO, ice chip ok one at a time prn  -Frequent oral care/hygiene    Outcome: Progressing

## 2018-10-20 NOTE — PLAN OF CARE
DISCHARGE PLANNING    • Discharge to home or other facility with appropriate resources Progressing        Impaired Swallowing    • Minimize aspiration risk Progressing        METABOLIC/FLUID AND ELECTROLYTES - ADULT    • Glucose maintained within prescribe

## 2018-10-20 NOTE — ED NOTES
Attempted to straight cath Pt for clean catch urine sample, as Pt is incontinent; however, this was not performed as Pt's daughter refused to allow RN to perform straight cath stating it causes her Mom too much discomfort

## 2018-10-20 NOTE — ED NOTES
Pt family daughter to nurses station asking what room the patient was being admitted to. This RN informed the pt's daughter that there is not a room assignment yet but that I would happily inform her what room it is as soon as we get it.   Pt's daughter cl

## 2018-10-20 NOTE — PROGRESS NOTES
NURSING ADMISSION NOTE      Patient admitted via Cart  Oriented to room. Safety precautions initiated. Bed in low position. Call light in reach. Pt is aox3. States no pain  Pt has very wet, productive cough  Lung sounds coarse and congested.   Medic

## 2018-10-21 ENCOUNTER — APPOINTMENT (OUTPATIENT)
Dept: GENERAL RADIOLOGY | Facility: HOSPITAL | Age: 83
DRG: 189 | End: 2018-10-21
Attending: INTERNAL MEDICINE
Payer: MEDICARE

## 2018-10-21 ENCOUNTER — APPOINTMENT (OUTPATIENT)
Dept: GENERAL RADIOLOGY | Facility: HOSPITAL | Age: 83
DRG: 189 | End: 2018-10-21
Attending: HOSPITALIST
Payer: MEDICARE

## 2018-10-21 PROBLEM — G40.909 SEIZURE DISORDER (HCC): Status: ACTIVE | Noted: 2018-10-21

## 2018-10-21 PROCEDURE — 99232 SBSQ HOSP IP/OBS MODERATE 35: CPT | Performed by: HOSPITALIST

## 2018-10-21 PROCEDURE — 71045 X-RAY EXAM CHEST 1 VIEW: CPT | Performed by: INTERNAL MEDICINE

## 2018-10-21 PROCEDURE — 99223 1ST HOSP IP/OBS HIGH 75: CPT | Performed by: OTHER

## 2018-10-21 PROCEDURE — 71045 X-RAY EXAM CHEST 1 VIEW: CPT | Performed by: HOSPITALIST

## 2018-10-21 RX ORDER — IPRATROPIUM BROMIDE AND ALBUTEROL SULFATE 2.5; .5 MG/3ML; MG/3ML
3 SOLUTION RESPIRATORY (INHALATION)
Status: DISCONTINUED | OUTPATIENT
Start: 2018-10-21 | End: 2018-10-21

## 2018-10-21 RX ORDER — KETOROLAC TROMETHAMINE 30 MG/ML
15 INJECTION, SOLUTION INTRAMUSCULAR; INTRAVENOUS EVERY 6 HOURS PRN
Status: ACTIVE | OUTPATIENT
Start: 2018-10-21 | End: 2018-10-23

## 2018-10-21 RX ORDER — DEXTROSE AND SODIUM CHLORIDE 5; .9 G/100ML; G/100ML
INJECTION, SOLUTION INTRAVENOUS CONTINUOUS
Status: DISCONTINUED | OUTPATIENT
Start: 2018-10-21 | End: 2018-10-21

## 2018-10-21 RX ORDER — MORPHINE SULFATE 4 MG/ML
INJECTION, SOLUTION INTRAMUSCULAR; INTRAVENOUS
Status: COMPLETED
Start: 2018-10-21 | End: 2018-10-21

## 2018-10-21 RX ORDER — MORPHINE SULFATE 4 MG/ML
2 INJECTION, SOLUTION INTRAMUSCULAR; INTRAVENOUS EVERY 2 HOUR PRN
Status: DISCONTINUED | OUTPATIENT
Start: 2018-10-21 | End: 2018-10-26

## 2018-10-21 RX ORDER — ONDANSETRON 2 MG/ML
4 INJECTION INTRAMUSCULAR; INTRAVENOUS EVERY 6 HOURS PRN
Status: DISCONTINUED | OUTPATIENT
Start: 2018-10-21 | End: 2018-10-26

## 2018-10-21 RX ORDER — METHYLPREDNISOLONE SODIUM SUCCINATE 125 MG/2ML
60 INJECTION, POWDER, LYOPHILIZED, FOR SOLUTION INTRAMUSCULAR; INTRAVENOUS EVERY 6 HOURS
Status: DISCONTINUED | OUTPATIENT
Start: 2018-10-21 | End: 2018-10-22

## 2018-10-21 RX ORDER — FUROSEMIDE 10 MG/ML
40 INJECTION INTRAMUSCULAR; INTRAVENOUS
Status: COMPLETED | OUTPATIENT
Start: 2018-10-21 | End: 2018-10-22

## 2018-10-21 RX ORDER — IPRATROPIUM BROMIDE AND ALBUTEROL SULFATE 2.5; .5 MG/3ML; MG/3ML
3 SOLUTION RESPIRATORY (INHALATION)
Status: DISCONTINUED | OUTPATIENT
Start: 2018-10-21 | End: 2018-10-26

## 2018-10-21 NOTE — PROGRESS NOTES
[facility] Note:  Age Based Dose Adjustment    Naina Wilkins has been prescribed ketorolac (TORADOL) 30 mg IV every 6 hours PRN. Patient is >71 years old therefore the dose has been adjusted to 15 mg IV every 6 hours PRN.       Thank you,  Renan Awad

## 2018-10-21 NOTE — SLP NOTE
Attempted to see patient this morning for swallow re-evaluation. She c/o mild nausea with patient having emesis about 7:10 am per daughter. She is NPO except for ice chips and oral medications per 10/20/18 initial evaluation.  Will reassess patient when fe

## 2018-10-21 NOTE — PLAN OF CARE
METABOLIC/FLUID AND ELECTROLYTES - ADULT    • Glucose maintained within prescribed range Progressing        RESPIRATORY - ADULT    • Achieves optimal ventilation and oxygenation Progressing        SAFETY ADULT - FALL    • Free from fall injury Progressing

## 2018-10-21 NOTE — PHYSICAL THERAPY NOTE
Consult received, chart reviewed. Attempted to see pt, family at b/s. Pt refused - reports she is tired and does not want to be out of bed or mobilize. Pt continued to refuse despite encouragement and education of mobility and PT evaluation.   Of note, p

## 2018-10-21 NOTE — PROGRESS NOTES
[facility]  Progress Note     Demetrice Fox Patient Status:  Observation    1924 MRN [id number]   [facility] 3NE-A Attending Ivy Vanegas MD   Hosp Day # 0 PCP Daiana Kraus MD     Chief Complaint: Dyspnea    S: Patient • predniSONE  60 mg Oral Daily   • Insulin Aspart Pen  1-10 Units Subcutaneous TID AC and HS   • azithromycin  500 mg Oral Daily   • piperacillin-tazobactam  3.375 g Intravenous Q8H   • insulin detemir  10 Units Subcutaneous [email or web address]   • ipratropium

## 2018-10-21 NOTE — PROGRESS NOTES
[facility] Patient Status:  Inpatient    1924 MRN [id number]   [facility] 3NE-A Attending Maura Copeland MD   Hosp Day # 0 PCP Hortencia Escoto MD     SUBJECTIVE: overnight, remained SOB     OBJECTIVE:  / glucose (DEX4) oral liquid 15 g, 15 g, Oral, Q15 Min PRN **OR** Glucose-Vitamin C (DEX-4) 4-0.006 g chewable tab 4 tablet, 4 tablet, Oral, Q15 Min PRN **OR** dextrose 50 % injection 50 mL, 50 mL, Intravenous, Q15 Min PRN **OR** glucose (DEX4) oral liquid 3 consolidation  -given lack of significant improvement and poor UO despite oral lasix, will change to lasix 40mg IV bid and monitor UO  -wean o2 as tolerated, bronchial hygiene  2.  Acute viral bronchitis with rhinovirus- supportive care- no better yet  -may

## 2018-10-21 NOTE — CONSULTS
[facility]    Report of Consultation    Allison Bermudez Patient Status:  Inpatient    1924 MRN [id number]   [facility] 3NE-A Attending Jules Betancourt MD   Hosp Day # 0 PCP Jaspal MD Prosper     Date of Admission:  10/19/2018 Father    • Cancer Mother    • Breast Cancer Mother       reports that she quit smoking about 29 years ago. Her smoking use included cigarettes. She has a 75.00 pack-year smoking history.  she has never used smokeless tobacco. She reports that she drinks al glucose (DEX4) oral liquid 15 g, 15 g, Oral, Q15 Min PRN **OR** Glucose-Vitamin C (DEX-4) 4-0.006 g chewable tab 4 tablet, 4 tablet, Oral, Q15 Min PRN **OR** dextrose 50 % injection 50 mL, 50 mL, Intravenous, Q15 Min PRN **OR** glucose (DEX4) oral liquid tremors. Able to lift and hold arms up. Mild right hand  weakness.  Gait: deferred      Diagnostic Data:    Recent Labs   Lab  10/19/18   2100   RBC  4.24   HGB  12.5   HCT  39.5   MCV  93.2   MCH  29.5   MCHC  31.6   RDW  14.9   NEPRELIM  7.15*   WBC

## 2018-10-21 NOTE — SLP NOTE
Second attempt today to re-evaluate patient. Spoke with RN Yandel Joseph, who stated that patient continues with nausea, is more SOB than earlier today and is sleeping right now. Rec/POC:  1. Continue NPO  2.   Frequent oral care to maintain oral hydration

## 2018-10-22 PROCEDURE — 99233 SBSQ HOSP IP/OBS HIGH 50: CPT | Performed by: HOSPITALIST

## 2018-10-22 RX ORDER — HEPARIN SODIUM AND DEXTROSE 10000; 5 [USP'U]/100ML; G/100ML
1000 INJECTION INTRAVENOUS ONCE
Status: COMPLETED | OUTPATIENT
Start: 2018-10-22 | End: 2018-10-22

## 2018-10-22 RX ORDER — HEPARIN SODIUM AND DEXTROSE 10000; 5 [USP'U]/100ML; G/100ML
INJECTION INTRAVENOUS CONTINUOUS
Status: DISCONTINUED | OUTPATIENT
Start: 2018-10-22 | End: 2018-10-25

## 2018-10-22 RX ORDER — DILTIAZEM HYDROCHLORIDE 5 MG/ML
INJECTION INTRAVENOUS
Status: COMPLETED
Start: 2018-10-22 | End: 2018-10-22

## 2018-10-22 RX ORDER — METHYLPREDNISOLONE SODIUM SUCCINATE 40 MG/ML
40 INJECTION, POWDER, LYOPHILIZED, FOR SOLUTION INTRAMUSCULAR; INTRAVENOUS DAILY
Status: DISCONTINUED | OUTPATIENT
Start: 2018-10-23 | End: 2018-10-24

## 2018-10-22 RX ORDER — DIGOXIN 0.25 MG/ML
500 INJECTION INTRAMUSCULAR; INTRAVENOUS ONCE
Status: COMPLETED | OUTPATIENT
Start: 2018-10-22 | End: 2018-10-22

## 2018-10-22 RX ORDER — HEPARIN SODIUM 5000 [USP'U]/ML
5000 INJECTION INTRAVENOUS; SUBCUTANEOUS ONCE
Status: COMPLETED | OUTPATIENT
Start: 2018-10-22 | End: 2018-10-22

## 2018-10-22 RX ORDER — DILTIAZEM HYDROCHLORIDE 5 MG/ML
20 INJECTION INTRAVENOUS ONCE
Status: COMPLETED | OUTPATIENT
Start: 2018-10-22 | End: 2018-10-22

## 2018-10-22 RX ORDER — HEPARIN SODIUM 5000 [USP'U]/ML
30 INJECTION INTRAVENOUS; SUBCUTANEOUS ONCE
Status: DISCONTINUED | OUTPATIENT
Start: 2018-10-22 | End: 2018-10-22

## 2018-10-22 RX ORDER — METOPROLOL TARTRATE 5 MG/5ML
5 INJECTION INTRAVENOUS EVERY 4 HOURS PRN
Status: DISCONTINUED | OUTPATIENT
Start: 2018-10-22 | End: 2018-10-26

## 2018-10-22 NOTE — PLAN OF CARE
Problem: Impaired Swallowing  Goal: Minimize aspiration risk  Recommend:  -NPO, ice chip ok one at a time prn  -Frequent oral care/hygiene     Outcome: Not Progressing  Persistent nausea is a barrier to progress at this time

## 2018-10-22 NOTE — SLP NOTE
ADULT SWALLOWING RE-EVALUATION    ASSESSMENT    ASSESSMENT/OVERALL IMPRESSION:  Patient seen for reassessment of swallow as per POC. Patient currently on 6L O2 NC in which O2 sats are currently in the low 90's.   RN, [facility] approved PO trials at this matthew History:   Diagnosis Date   • Arrhythmia    • Arthritis    • Cancer ([address]     basal cell removed from nose over 5 years ago   • COPD (chronic obstructive pulmonary disease) (HCC)    • Diverticulitis    • High blood pressure    • History of blood transfusio Solids: NPO; Diet Recommendations - Liquid: NPO, ice chips o.k.; Frequent oral care/hygiene please; Medication Administration Recommendations: One pill at a time; Whole in puree;Crushed in puree; Treatment Plan/Recommendations: SLP to reassess  BSE 9/22/18: Timing: Appears impaired  Laryngeal Elevation Strength: Appears intact  Laryngeal Elevation Coordination: Appears impaired    Esophageal Phase of Swallow: No complaints consistent with possible esophageal involvement  Comments:           GOALS  Goal #1   S

## 2018-10-22 NOTE — PROGRESS NOTES
RAPID RESPONSE NOTE  Shortly after physical therapy got patient up to chair this am, patient calling out to go back to bed and monitor tech called this RN to inform patient was tachycardic and then called again to notify patient converted to A fib RVR.    Payton Ledesma

## 2018-10-22 NOTE — CONSULTS
[facility]  Consultation Note      Brendia Card Patient Status:  Inpatient    1924 MRN [id number]   [facility] 3NE-A Attending Maura Copeland MD   Hosp Day # 1 PCP Hortencia Escoto MD     Reason for ALIX [facility] nebulizer solution 3 mL 3 mL Nebulization QID   Metoprolol Succinate ER (Toprol XL) 24 hr tab 100 mg 100 mg Oral Daily Beta Blocker   PEG 3350 (MIRALAX) powder packet 17 g 17 g Oral BID   Potassium Chloride ER (K-DUR M20) CR tab 20 mEq 20 mEq Oral Daily • Diverticulitis    • High blood pressure    • History of blood transfusion    • Other and unspecified hyperlipidemia    • Overactive bladder    • Pneumonia    • Skin cancer    • [facility]     2000   • TIA (transient ischemic attack)    • Type II or u negative for bleeding  Musculoskeletal:negative for myalgias  Neurological: negative for dizziness and headaches  Endocrine: negative for temperature intolerance      Physical Exam:  Blood pressure 125/70, pulse (!) 134, temperature 98.1 °F (36.7 °C), temp 10/21/2018    PTP 15.9 10/21/2018    PGLU 303 10/22/2018         Thank you for allowing our practice to participate in the care of your patient. Please do not hesitate to contact me if you have any questions.     Nacho Medrano MD, [facility]  10/22/2018  2

## 2018-10-22 NOTE — PROGRESS NOTES
Patient has been started on cardizem gtt and heparin gtt this afternoon, HR sustains in 130's-150's, cardiology paged earlier, gtt increased to 20mg/hr, prn labetolol ordered and given, HR still sustaining 140's jumping to 160's at times, cardio called aga

## 2018-10-22 NOTE — PROGRESS NOTES
[facility]  Progress Note     Vivian Newton Patient Status:  Observation    1924 MRN [id number]   [facility] 3NE-A Attending Nestor Clayton MD   Hosp Day # 1 PCP More Chen MD     Chief Complaint: Dyspnea    S: Patient [email or web address] [email or web address]   • MethylPREDNISolone Sodium Succ  60 mg Intravenous Q6H   • ipratropium-albuterol  3 mL Nebulization QID   • Metoprolol Succinate ER  100 mg Oral Daily Beta Blocker   • PEG 3350  17 g Oral BID   • Potassium Chloride ER  20 mEq

## 2018-10-22 NOTE — OCCUPATIONAL THERAPY NOTE
IP OT attempt to see patient for therapeutic assessment/treatment. JARRED Ojeda Done) states that patient is not appropriate for therapy at this time,  RR called around 11am with  HR's in the 170's. Asked therapist to hold today. Will attempt again as able.

## 2018-10-22 NOTE — PHYSICAL THERAPY NOTE
PHYSICAL THERAPY QUICK EVALUATION - INPATIENT    Room Number: 5937/9669-H  Evaluation Date: 10/22/2018  Presenting Problem: Bronchospasm  Physician Order: PT Eval and Treat    Problem List  Principal Problem:    Acute bronchospasm  Active Problems:    Diab reached at [phone] and confirmed above history. SUBJECTIVE  \"I'm nervous about this. \"    OBJECTIVE  Precautions: Seizure  Fall Risk: High fall risk    WEIGHT BEARING RESTRICTION  Weight Bearing Restriction: None                PAIN ASSESSMENT  Ra SHORT FORM - BASIC MOBILITY  How much difficulty does the patient currently have. ..  -   Turning over in bed (including adjusting bedclothes, sheets and blankets)?: A Little   -   Sitting down on and standing up from a chair with arms (e.g., wheelchair, be is considered low. PT Discharge Recommendations: (Home to [facility])    Equipment recommendations: As pt requires 2 person assist for transfers, would recommend nursing staff use sit<>stand lift equipment for safety.   PLAN  Patient will jalil

## 2018-10-23 ENCOUNTER — APPOINTMENT (OUTPATIENT)
Dept: GENERAL RADIOLOGY | Facility: HOSPITAL | Age: 83
DRG: 189 | End: 2018-10-23
Attending: INTERNAL MEDICINE
Payer: MEDICARE

## 2018-10-23 PROCEDURE — 71045 X-RAY EXAM CHEST 1 VIEW: CPT | Performed by: INTERNAL MEDICINE

## 2018-10-23 PROCEDURE — 99232 SBSQ HOSP IP/OBS MODERATE 35: CPT | Performed by: HOSPITALIST

## 2018-10-23 RX ORDER — SODIUM CHLORIDE 9 MG/ML
INJECTION, SOLUTION INTRAVENOUS CONTINUOUS
Status: DISCONTINUED | OUTPATIENT
Start: 2018-10-23 | End: 2018-10-24

## 2018-10-23 RX ORDER — FUROSEMIDE 10 MG/ML
40 INJECTION INTRAMUSCULAR; INTRAVENOUS ONCE
Status: COMPLETED | OUTPATIENT
Start: 2018-10-23 | End: 2018-10-23

## 2018-10-23 RX ORDER — METOCLOPRAMIDE HYDROCHLORIDE 5 MG/ML
5 INJECTION INTRAMUSCULAR; INTRAVENOUS EVERY 8 HOURS PRN
Status: DISCONTINUED | OUTPATIENT
Start: 2018-10-23 | End: 2018-10-26

## 2018-10-23 NOTE — PROGRESS NOTES
[facility]  Progress Note     Kodi oHu Patient Status:  Observation    1924 MRN [id number]   [facility] 3NE-A Attending Tony Sanchez MD   Hosp Day # 2 PCP Enmanuel Ward MD     Chief Complaint: Dyspnea    S: Patient • levETIRAcetam  250 mg Intravenous Q12H   • insulin detemir  20 Units Subcutaneous [email or web address]   • MethylPREDNISolone Sodium Succ  40 mg Intravenous Daily   • ipratropium-albuterol  3 mL Nebulization QID   • PEG 3350  17 g Oral BID   • Potassium Chlorid As above patient flipped into A. fib yesterday rates were difficult to control however better today. She failed her speech eval today. We will start her on gentle IV fluid hydration as she has been n.p.o.   Family is indicated in past they did not want an

## 2018-10-23 NOTE — PLAN OF CARE
Problem: Impaired Swallowing  Goal: Minimize aspiration risk  Recommend:  -NPO, ice chip ok one at a time prn  -Frequent oral care/hygiene  -Vfss when medically able to tolerate  Outcome: Not Progressing

## 2018-10-23 NOTE — PROGRESS NOTES
Pharmacy Note: Renal dose adjustment for Metoclopramide (Reglan)  Naina Wilkins has been prescribed Metoclopramide (Reglan) 10 mg every 8 hours as needed. CrCl estimated at 37.9 ml/min (based on minimum Scr =0.85 for age >74).   Latest Scr = 0.78 m

## 2018-10-23 NOTE — SLP NOTE
SPEECH DAILY NOTE - INPATIENT    ASSESSMENT & PLAN   ASSESSMENT  Pt seen for dysphagia tx to reassess swallow fxn, determine safety for initiation of po diet and/or decisions re: plan of care. Pt admitted with worsening cough, congestion and fevers.  Previo with RN            GOALS  Goal #1 Video swallow study when pt medically able to tolerate; further goals pending results of exam  To be completed                                        FOLLOW UP  Follow Up Needed: Yes  SLP Follow-up Date: 10/24/18       Ses

## 2018-10-23 NOTE — PROGRESS NOTES
Patient HR improved, 100's now after digoxin administration. Patient more disoriented after morphine administration. Patient reoriented by nursing staff. Secretions thick, tan in color, some sputum clots. Suctioned orally.  Heparin gtt continued at 10ml/hr,

## 2018-10-23 NOTE — OCCUPATIONAL THERAPY NOTE
OCCUPATIONAL THERAPY EVALUATION - INPATIENT     Room Number: 6621/4393-Q  Evaluation Date: 10/23/2018  Type of Evaluation: Initial  Presenting Problem: Acute Bronshospasm    Physician Order: IP Consult to Occupational Therapy  Reason for Therapy: ADL/IADL week or so. +low grade fevers. Around 5 this afternoon she started to have worsening symptoms. She was brought to the ED by family. She is currently on bactrim for a UTI. In the ED she is noted to have course breath sounds with wheezes.   CXR show Dominance: Right  Drives: No  Patient Regularly Uses: Reading glasses    Prior Level of Function: Pt lives at [facility]. reports that she typically requires 1-2 person for all mobility.   Pt states she does not ambulate but requires 2 person assist for s includes using toilet, bedpan or urinal? : Total  -   Putting on and taking off regular upper body clothing?: Total  -   Taking care of personal grooming such as brushing teeth?: A Lot  -   Eating meals?: A Little    AM-PAC Score:  Score: 9  Approx Degree occupational profile, detailed assessments, several treatment options    Overall Complexity MODERATE     OT Discharge Recommendations: Home  OT Device Recommendations: Reacher

## 2018-10-23 NOTE — PROGRESS NOTES
Pulmonary Progress Note      NAME: Celina Zuluaga - ROOM: 133/8691-P - MRN: [id number] - Age: 80year old - : 1924    Assessment/Plan:  1. Acute hypoxemic resp failure- CXR today more consistent with pulm edema. Non specific findings.   Continu m)   Wt 202 lb (91.6 kg)   LMP  (LMP Unknown)   SpO2 90%   BMI 32.60 kg/m²   Physical Exam:   General: alert, cooperative, no respiratory distress. HEENT: Normocephalic atraumatic. Lips, mucosa, and tongue normal.  No thrush noted.    Neck: supple without

## 2018-10-23 NOTE — PROGRESS NOTES
[facility] [address] [place] [address] Cardiology Progress Note        Brendan Rollins Patient Status:  Inpatient    1924 MRN [id number]   [facility] 3NE-A Attending Stefan Camarena MD   Hosp Day # 2 PCP Enrico Garg MD     Subjective: 93  Resp:  [20-29] 20  BP: ()/(50-86) 91/67    General: No apparent distress  HEENT: No focal deficits. Neck: supple. JVP normal  Cardiac: Irregular rhythm, S1, S2 normal,  rub or gallop. No murmur. Lungs: Coarse rhonchi, exp wheezes. Abdomen:  So

## 2018-10-24 ENCOUNTER — APPOINTMENT (OUTPATIENT)
Dept: GENERAL RADIOLOGY | Facility: HOSPITAL | Age: 83
DRG: 189 | End: 2018-10-24
Attending: HOSPITALIST
Payer: MEDICARE

## 2018-10-24 PROCEDURE — 99232 SBSQ HOSP IP/OBS MODERATE 35: CPT | Performed by: HOSPITALIST

## 2018-10-24 PROCEDURE — 74230 X-RAY XM SWLNG FUNCJ C+: CPT | Performed by: HOSPITALIST

## 2018-10-24 RX ORDER — METHYLPREDNISOLONE SODIUM SUCCINATE 40 MG/ML
20 INJECTION, POWDER, LYOPHILIZED, FOR SOLUTION INTRAMUSCULAR; INTRAVENOUS DAILY
Status: DISCONTINUED | OUTPATIENT
Start: 2018-10-25 | End: 2018-10-25

## 2018-10-24 RX ORDER — FUROSEMIDE 10 MG/ML
40 INJECTION INTRAMUSCULAR; INTRAVENOUS DAILY
Status: DISCONTINUED | OUTPATIENT
Start: 2018-10-24 | End: 2018-10-26

## 2018-10-24 RX ORDER — LEVETIRACETAM 250 MG/1
250 TABLET ORAL 2 TIMES DAILY
Status: DISCONTINUED | OUTPATIENT
Start: 2018-10-24 | End: 2018-10-26

## 2018-10-24 RX ORDER — GUAIFENESIN 600 MG
600 TABLET, EXTENDED RELEASE 12 HR ORAL 2 TIMES DAILY
Status: DISCONTINUED | OUTPATIENT
Start: 2018-10-24 | End: 2018-10-26

## 2018-10-24 NOTE — VIDEO SWALLOW STUDY NOTE
ADULT VIDEOFLUOROSCOPIC SWALLOWING STUDY    Admission Date: 10/19/2018  Evaluation Date: 10/24/18  Radiologist: Dr. Gavin Juarez: Mechanical soft chopped  Diet Recommendations - Liquid: Thin(small single sips. stable. Patchy consolidations in the lower lobes and small pleural effusions are stable. No pneumothorax.     =====  CONCLUSION:  No significant change since prior exam.      Reason for Referral: R/O aspiration      Family/Patient Goals:   To eat Yes  Penetration Aspiration Scale Score: Score 1: Material does not enter airway       Overall Impression: Pt alert and participatory. Pt seated upright 90 degrees upright in BLANE chair and viewed in the lateral plane. Pt on 4 L O2 NC. Pt denied pain.   Pt their apparent satisfaction. INTERDISCIPLINARY COMMUNICATION  Reviewed results with Radiologist; agreement verbalized.       FOLLOW UP  Treatment Plan/Recommendations: Dysphagia therapy  Number of Visits to Meet Established Goals: (1-2)        Thank you

## 2018-10-24 NOTE — PROGRESS NOTES
[facility] Patient Status:  Inpatient    1924 MRN [id number]   [facility] 3NE-A Attending Bruno Tapia MD   Hosp Day # 3 PCP Hortencia Escoto MD     Pulm / Critical Care Progress Note     S: pt states she f 3 completed shifts: In: 1536.9 [I.V.:1536.9]  Out: 75 [Other:75]  I/O this shift:  In: 1828.1 [I.V.:1828.1]  Out: -      Physical Exam:   General: alert, cooperative, No respiratory distress. Head: Normocephalic, without obvious abnormality, atraumatic. AM

## 2018-10-24 NOTE — PLAN OF CARE
Problem: Impaired Swallowing  Goal: Minimize aspiration risk  Recommend:  SOft/chopped with thin  90 degree upright. Up to chair preferred. Small bites/sips  No straws. Pills 1 at a time whole in puree.   Outcome: Progressing

## 2018-10-24 NOTE — PROGRESS NOTES
[facility]  Progress Note     Gita Rodriguez Patient Status:  Observation    1924 MRN [id number]   [facility] 3NE-A Attending Janel Mckeon MD   Hosp Day # 3 PCP Christophe Hamilton MD     Chief Complaint: Dyspnea    S: HUDJCU Estimated Creatinine Clearance: 57.5 mL/min (based on SCr of 0.56 mg/dL). Recent Labs   Lab  10/21/18   2140   PTP  15.9*   INR  1.22*       Recent Labs   Lab  10/19/18   2111   TROP  <0.046            Imaging: Imaging data reviewed in Epic.     [facility] Estimated date of discharge: TBD, [facility]    Plan of care discussed with Patient, RN. Michelle DUENAS  12:53 PM     Agree with above.  Thankfully patient has passed swallow eval. Still with wheezing and dysp

## 2018-10-24 NOTE — PROGRESS NOTES
[facility] Cardiology Progress Note        Tiffanie Barnes Patient Status:  Inpatient    1924 MRN [id number]   [facility] 3NE-A Attending Carlos Boudreaux MD   Hosp Day # 3 PCP Boom Villarreal MD     Subjective: [86-97] 90  Resp:  [20] 20  BP: (139-153)/(61-88) 153/61    General: No apparent distress  HEENT: No focal deficits. Neck: supple. JVP normal  Cardiac: Irregular rhythm, S1, S2 normal,  rub or gallop. No murmur. Lungs: Coarse rhonchi, exp wheezes.   Abd

## 2018-10-24 NOTE — PROGRESS NOTES
Speech therapy informed this RN that patient passed video swallow; can have mechanical soft diet with thin liquids. Supervision, aspiration precautions, sitting upright and alert.  This RN paged hospitalist service as well as cardiology to inquire if meds c

## 2018-10-24 NOTE — PHYSICAL THERAPY NOTE
Patient presented semi-supine in bed; VSS and agreeable to participate. Performed restorative BUE/BLE TherEx as directed by PT/OT. Upon completion, patient repositioned in bed for comfort with call light in reach. RN Omar aware of session.

## 2018-10-24 NOTE — PHYSICAL THERAPY NOTE
Pt seen by IP mobility team and  was able to complete all exercise as outlined in the POC. Will continue to see as Mobility IP PT.

## 2018-10-25 ENCOUNTER — APPOINTMENT (OUTPATIENT)
Dept: GENERAL RADIOLOGY | Facility: HOSPITAL | Age: 83
DRG: 189 | End: 2018-10-25
Attending: INTERNAL MEDICINE
Payer: MEDICARE

## 2018-10-25 PROCEDURE — 71045 X-RAY EXAM CHEST 1 VIEW: CPT | Performed by: INTERNAL MEDICINE

## 2018-10-25 PROCEDURE — 99232 SBSQ HOSP IP/OBS MODERATE 35: CPT | Performed by: HOSPITALIST

## 2018-10-25 RX ORDER — PREDNISONE 20 MG/1
20 TABLET ORAL
Status: DISCONTINUED | OUTPATIENT
Start: 2018-10-26 | End: 2018-10-26

## 2018-10-25 RX ORDER — DILTIAZEM HYDROCHLORIDE 180 MG/1
180 CAPSULE, EXTENDED RELEASE ORAL DAILY
Status: DISCONTINUED | OUTPATIENT
Start: 2018-10-25 | End: 2018-10-26

## 2018-10-25 RX ORDER — POTASSIUM CHLORIDE 20 MEQ/1
40 TABLET, EXTENDED RELEASE ORAL EVERY 4 HOURS
Status: COMPLETED | OUTPATIENT
Start: 2018-10-25 | End: 2018-10-25

## 2018-10-25 RX ORDER — METOPROLOL SUCCINATE 50 MG/1
100 TABLET, EXTENDED RELEASE ORAL
Status: DISCONTINUED | OUTPATIENT
Start: 2018-10-25 | End: 2018-10-26

## 2018-10-25 NOTE — SLP NOTE
SPEECH DAILY NOTE - INPATIENT    ASSESSMENT & PLAN   ASSESSMENT  Pt seen for dysphagia tx to assess tolerance with recommended diet, ensure proper utilization of aspiration precautions and provide pt/family education. VFSS completed yesterday.   Patient se aspiration precautions and swallow strategies independently over 1-2 session(s).    Progressing   Goal #3 The patient will utilize compensatory strategies as outlined by  VFSS including Slow rate, Small bites, Small sips, No straws, Upright 90 degrees with

## 2018-10-25 NOTE — PROGRESS NOTES
[facility] Patient Status:  Inpatient    1924 MRN [id number]   [facility] 3NE-A Attending Kg Bonilla MD   [facility] Day # 4 PCP Jonnathan Zazueta MD     Pulm / Critical Care Progress Note     S: feels ok.  Courtney Ervin Encounters:  10/25/18 : 195 lb 12.8 oz (88.8 kg)  09/21/18 : 200 lb (90.7 kg)  02/10/18 : 176 lb 2.4 oz (79.9 kg)       I/O last 3 completed shifts: In: 3365 [I.V.:3365]  Out: -   I/O this shift: In: 923.8 [I.V.:706.8;  IV PIGGYBACK:217]  Out: -      Phys development of bilateral haziness more suggestive of volume overload  -cont with zosyn d#6/7  5. proph- eliquis  6.  Dispo- DNR/DNI  -will follow             Joyce Gilbert M.D.  [facility] pulmonary / critical care  10/25/2018  10:21 AM

## 2018-10-25 NOTE — PROGRESS NOTES
[facility]  Progress Note     Elinor Colindres Patient Status:  Observation    1924 MRN [id number]   [facility] 3NE-A Attending Dayna Magana MD   Hosp Day # 4 PCP Huma Pugh MD     Chief Complaint: Dyspnea    S: IJAREN ALKPHO  109  93  88   --    --    --    AST  13*  6*  8*   --    --    --    ALT  17  16  12*   --    --    --    BILT  0.2  0.5  0.4   --    --    --    TP  7.0  6.9  6.7   --    --    --        Estimated Creatinine Clearance: 61.9 mL/min (A) (based on SC 2. nebs, wean O2, lasix IV, Zosyn IV  3. SLP following - passed VFSS - mech soft/thins  4. Preserved EF on echo  5. NGTD on blood cx  3. Previous hx of possible seizures  1. keppra po  4. Citrobacter UTI  1. Cover by zosyn also, from culture on 10/15  5.  H

## 2018-10-25 NOTE — PLAN OF CARE
Patient A&O x4, lungs diminished, rhonchi much improved and much less sputum coming up. No c/o pain this shift, shoulder doing much better.   Patient had some issues swallowing the miralax water, but did well with the medication in AS and regular thin liqu

## 2018-10-25 NOTE — PROGRESS NOTES
[facility] [address] [place] [address] Cardiology Progress Note        Tiffanie Barnes Patient Status:  Inpatient    1924 MRN [id number]   [facility] 3NE-A Attending Carlos Boudreaux MD   Hosp Day # 4 PCP Boom Villarreal MD     Subjective: kg)  02/10/18 0500 : 176 lb 2.4 oz (79.9 kg)  02/09/18 0451 : 177 lb 7.5 oz (80.5 kg)  02/07/18 1858 : 185 lb (83.9 kg)           Physical Exam:    Temp:  [97.8 °F (36.6 °C)-98 °F (36.7 °C)] 97.9 °F (36.6 °C)  Pulse:  [66-89] 66  Resp:  [14-18] 18  BP: (12 med nebs, steroids. Passed swallow eval, but clearly has problems handling her secretions. 2. PAF - now rate controlled on IV cardizem. Historically on Eliquis, now heparin.   3. Prior CVA          Plan:  Stop IV cardizem and resume PO rate control as at

## 2018-10-26 VITALS
SYSTOLIC BLOOD PRESSURE: 91 MMHG | DIASTOLIC BLOOD PRESSURE: 69 MMHG | TEMPERATURE: 99 F | BODY MASS INDEX: 30.96 KG/M2 | HEIGHT: 66 IN | HEART RATE: 102 BPM | WEIGHT: 192.63 LBS | RESPIRATION RATE: 22 BRPM | OXYGEN SATURATION: 91 %

## 2018-10-26 PROCEDURE — 99232 SBSQ HOSP IP/OBS MODERATE 35: CPT | Performed by: HOSPITALIST

## 2018-10-26 RX ORDER — POTASSIUM CHLORIDE 20 MEQ/1
20 TABLET, EXTENDED RELEASE ORAL DAILY
Status: DISCONTINUED | OUTPATIENT
Start: 2018-10-26 | End: 2018-10-26

## 2018-10-26 RX ORDER — FUROSEMIDE 40 MG/1
40 TABLET ORAL DAILY
Status: DISCONTINUED | OUTPATIENT
Start: 2018-10-26 | End: 2018-10-26

## 2018-10-26 RX ORDER — HYDROCODONE BITARTRATE AND ACETAMINOPHEN 5; 325 MG/1; MG/1
1 TABLET ORAL EVERY 8 HOURS PRN
Qty: 10 TABLET | Refills: 0 | Status: SHIPPED | OUTPATIENT
Start: 2018-10-26

## 2018-10-26 RX ORDER — PREDNISONE 20 MG/1
TABLET ORAL
Qty: 7 TABLET | Refills: 0 | Status: SHIPPED | OUTPATIENT
Start: 2018-10-26

## 2018-10-26 NOTE — PLAN OF CARE
Problem: Impaired Swallowing  Goal: Minimize aspiration risk  Recommend:  SOft/chopped with thin  90 degree upright. Up to chair preferred. Small bites/sips  Pills 1 at a time whole in puree.    Outcome: Adequate for Discharge

## 2018-10-26 NOTE — CM/SW NOTE
MSW arranged 02 through HME. MSW spoke with agency who informed MSW that the patient is approved and will have 02 delivered tonight. RN will call respiratory to have them deliver an HME tank to the patient's room. Justyn  aware of patient dc today.

## 2018-10-26 NOTE — PROGRESS NOTES
[facility] HOSPITALIST  Progress Note     Wanda Burroughs Patient Status:  Observation    1924 MRN [id number]   [facility] 3NE-A Attending Ayaan Oliver MD   Hosp Day # 5 PCP Jesus Alberto Kirkland MD     Chief Complaint: Dyspnea    S: RSNEPK CO2  30.0  31.0  32.0  32.0  31.0  32.0   --    ALKPHO  109  93  88   --    --    --    --    AST  13*  6*  8*   --    --    --    --    ALT  17  16  12*   --    --    --    --    BILT  0.2  0.5  0.4   --    --    --    --    TP  7.0  6.9  6.7   --    -- 2. Acute Hypoxic Resp Failure 2/2 Aspiration PNA, rhinovirus, pulm edema and Acute bronchospasm  1. Steroids tapered to 20mg daily po   2. nebs, wean O2, lasix po, Zosyn IV Day 7  3. SLP following - [facility]  4.  Preserved EF on echo  5

## 2018-10-26 NOTE — CM/SW NOTE
MSW spoke with Monse Barron from [address] [place] [address] Drive: [phone]  F: [phone] to update her on status. No anticipated dc today.     Uziel Mccurdy LCSW

## 2018-10-26 NOTE — PLAN OF CARE
I reviewed discharge planning with dtpamela Chappell. She reports she is concerned about her mother aspirating and coming back to the hospital with PNA.   I discussed with her this is a possibility given deconditioning/dysphagia and that is why she needs to incr

## 2018-10-26 NOTE — PLAN OF CARE
SPO2 on room air at rest: 88%  SPO2 on room air while talking (pt unable to walk): 87%  SPO2 on 2L at rest: 93%  SPO2 on 2L while talkin%

## 2018-10-26 NOTE — SLP NOTE
SPEECH DAILY NOTE - INPATIENT    ASSESSMENT & PLAN   ASSESSMENT  Pt seen for dysphagia tx to assess tolerance with recommended diet, ensure proper utilization of aspiration precautions and provide pt/family education.  Patient on supplemental O2, 1.5L via N swallow strategies independently over 1-2 session(s).    Goal met with patient   Goal #3 The patient will utilize compensatory strategies as outlined by  VFSS including Slow rate, Small bites, Small sips, Upright 90 degrees with min assistance 100 % of the

## 2018-10-26 NOTE — CM/SW NOTE
MSW sent [address]: [phone]  F: [phone] updates for anticipated dc today. MSW also sent new referral to [facility] for new home 02. Awaiting response.     Adelfo Varela LCSW

## 2018-10-26 NOTE — PROGRESS NOTES
[facility] [address] [place] [address] Cardiology Progress Note        Dacia Pizarro Patient Status:  Inpatient    1924 MRN [id number]   [facility] 3NE-A Attending Cecile Ramos MD   Hosp Day # 5 PCP Em Thomson MD     Subjective: 7.5 oz (80.5 kg)  02/07/18 1858 : 185 lb (83.9 kg)           Physical Exam:    Temp:  [97.5 °F (36.4 °C)-98.4 °F (36.9 °C)] 97.5 °F (36.4 °C)  Pulse:  [] 78  Resp:  [18-20] 18  BP: (124-167)/() 142/89    General: No apparent distress  HEENT: No toilet, med nebs, steroids. Passed swallow eval, but clearly has problems handling her secretions. Improved. 2. PAF - now rate controlled on dilt and toprol. Back on eliquis. 3. Prior CVA          Plan:  CV status appears stable for d/c.   D/c on same

## 2018-10-26 NOTE — PLAN OF CARE
Pt is stable, just finishing eating, denies any pain or shortness of breath, awaiting for transport back to [facility].  Her daughter called and verbalized concern that she would have pneumonia again, Dr. Garfield Ortiz notified and he personally called and discussed

## 2018-10-26 NOTE — CM/SW NOTE
MSW spoke with RN in regards to the home 02 study.   The following information is needed:    Walk/Test/Clinical/Progress Note    Test Date:    SPO2% on Room Air at rest:   SPO2% Ambulation on Room Air:  SPO2% Ambulation on O2:     On     Liters per Minute

## 2018-10-26 NOTE — DISCHARGE SUMMARY
[facility] HOSPITALIST  DISCHARGE SUMMARY     Idaniazachery Wilkins Patient Status:  Inpatient    1924 MRN [id number]   [facility] 3NE-A Attending Lizandro Turner MD   [facility] Day # 5 PCP [place] MD     Date of Admission: 10/19/2018  Micky concern for bronchitis with bronchospasms. She was started on steroids, nebs. She continued on antibiotics for Citrobacter UTI. She had acute aspiration event leading to further desaturation and was started on Zosyn IV. RVP +rhinovirus.   Speech therapy or after a meal   Quantity:  5 pen  Refills:  3     insulin detemir 100 UNIT/ML Sopn  Commonly known as:  LEVEMIR      Inject 20 Units into the skin 2 (two) times daily.  ok to substitute for vials   Quantity:  5 Device  Refills:  3        CHANGE how you ta Tabs  Generic drug:  Fexofenadine HCl      Take 180 mg by mouth daily. Refills:  0     levETIRAcetam 250 MG Tabs  Commonly known as:  KEPPRA      Take 1 tablet (250 mg total) by mouth 2 (two) times daily.    Quantity:  60 tablet  Refills:  5     Nystatin [phone], [phone]  [address] [facility]    Phone:  [phone]   · predniSONE 20 MG Tabs     Please  your prescriptions at the location directed by your doctor or nurse    Bring a paper prescription for each of

## 2018-10-26 NOTE — PROGRESS NOTES
[address] [place] [address] Patient Status:  Inpatient    1924 MRN [id number]   [facility] 3NE-A Attending Naina Echeverria MD   [address] Day # 5 PCP Mina Enciso MD     Pulm / Critical Care Progress Note     S: cough ongoing b (87.4 kg)  09/21/18 : 200 lb (90.7 kg)  02/10/18 : 176 lb 2.4 oz (79.9 kg)       I/O last 3 completed shifts: In: 3431.9 [P.O.:580; I.V.:2534.9; IV PIGGYBACK:317]  Out: -   No intake/output data recorded.      Physical Exam:   General: alert, cooperative, speech  4. Acute aspiration- CXR without new consolidation but development of bilateral haziness more suggestive of volume overload  -cont with zosyn d#7/7, d/c  5. proph- eliquis  6. Dispo- DNR/DNI  -d/c planning.  Home O2 ordered if plan is for assisted l

## 2018-10-29 NOTE — CM/SW NOTE
10/29/18 0850   Discharge disposition   Expected discharge disposition Home-Health   Name of [address] Provider [facility]   Discharge transportation [facility]

## 2021-02-06 DIAGNOSIS — Z23 NEED FOR VACCINATION: ICD-10-CM

## 2023-07-21 NOTE — PROGRESS NOTES
I cancelled celebrex and only sent amlodipine to the pharmacy now. Patient stated she couldn't eat lunch d/t all the sputum. This rn gave patient po lopressor crushed in applesauce, patient able to swallow it but soon after complained of nausea and had increased sputum and spit. Will notify hospitalist service.     1510 th

## 2023-08-09 NOTE — ED NOTES
MD at bedside for update on POC Pt arrives to ED via triage from home. Pt reports \"feeling like she needs to be watched\" as she is suicidal. Reports a plan of walking in front of a city bus.

## (undated) NOTE — IP AVS SNAPSHOT
[address]            (For Outpatient Use Only) Initial Admit Date: 8/30/2017   Inpt/Obs Admit Date: Inpt: 8/31/17 / Obs: 08/30/17   Discharge Date:    Hospital Acct:  [de-identified]   MRN: [de-identified]   CSN: [id number]        [facility] Group Number:  Insurance Type:    Subscriber Name:  Subscriber :    Subscriber ID:  Pt Rel to Subscriber:    Hospital Account Financial Class: Medicare    2017

## (undated) NOTE — IP AVS SNAPSHOT
Patient Demographics     Address  [place] [address] Phone  [phone] [facility]  [phone] [facility] E-mail Address  [email or web address]      Emergency Contact(s)     Name Relation Home Work Josh CANO Take 1 tablet by mouth 2 (two) times daily. Clobetasol Propionate 0.05 % Oint  Commonly known as:  TEMOVATE              DilTIAZem HCl ER Coated Beads 180 MG Cp24  Commonly known as:  CARDIZEM CD  Next dose due:   Tomorrow AM 9/3      Take 1 capsu predniSONE 10 MG Tabs  Commonly known as:  DELTASONE  Next dose due:   Tomorrow AM 9/3      Take 40mg for 3 days then 30mg for 3 days then 20mg for 3 days then 10mg indefinitely unless instructed otherwise by your primary doctor   Dena Gruber MD [id number] Pravastatin Sodium (PRAVACHOL) tab 20 mg 09/01/17 2120 Given      [id number] acetaminophen (TYLENOL) tab 650 mg (Or Linked Group #1) 09/02/17 1335 Given      [id number] apixaban (ELIQUIS) tab 2.5 mg 09/01/17 2120 Given      [id number] apixaban (NONA Low Risk      <1.0 mg/L     Average Risk  1.0-3.0 mg/L     High Risk     >3.0 mg/L                            C-REACTIVE PROTEIN [[id number]] (Abnormal)  Resulted: 09/02/17 1145, Result status: Final result   Ordering provider:  Eva Goins MD  09/02/ History of Present Illness: Anibal Hand is a 80year old female with PMhx of TIA, Afib on Eliquis, DM, HTN presents with difficulty speaking. History is obtained per chart review and family.  Pt noted difficulty with words around 930AM. She denies history. She has never used smokeless tobacco. She reports that she drinks alcohol. She reports that she does not use drugs.     Family History:   Family History   Problem Relation Age of Onset   • Diabetes Father    • Cancer Mother    • Breast Cancer [place] Rfl:    dextromethorphan-guaiFENesin (ROBITUSSIN DM)  MG/5ML Oral Syrup Take 5 mL by mouth 4 (four) times daily as needed. Disp:  Rfl:    Diltiazem HCl ER Coated Beads (CARDIZEM CD) 180 MG Oral Capsule SR 24 Hr Take 1 capsule by mouth daily.  Disp: 3 Cardiovascular: S1, S2. Regular rate and rhythm. No murmurs, rubs or gallops. Equal pulses. Chest and Back: No tenderness or deformity. Abdomen: Soft, nontender, nondistended. Positive bowel sounds. No rebound, guarding or organomegaly.   Neurologic: +s Author:  Rodrigue Bhatti PT Service:  Rehab Author Type:  Physical Therapist    Filed:  9/1/2017  4:48 PM Date of Service:  9/1/2017  4:19 PM Status:  Signed    :  Rodrigue Bhatti PT (Physical Therapist)           PHYSICAL THERAPY EVAL • Unspecified essential hypertension    • Urinary tract infection    • Vertigo    • Visual impairment     glasses   • Weakness[TF.2]        Past Surgical History[TF.1]  Past Surgical History:  No date: APPENDECTOMY  No date: COLECTOMY  2012: HIP SURGERY Ri ADDITIONAL TESTS[TF.1]  Additional Tests: Elderly Mobility Scale     Elderly Mobility Scale: 3/20[TF.2]         ACTIVITY TOLERANCE  No shortness of breath    AM-PAC '6-Clicks' INPATIENT SHORT FORM - BASIC MOBILITY  How much difficulty does the patient curr Patient End of Session: Up in chair;Needs met;Call light within reach;RN aware of session/findings; All patient questions and concerns addressed;SCDs in place; Family present[TF.2]    ASSESSMENT[TF.3      80year old[TF.2] female admitted from [facility] after not Goal #4[TF.1] Ind in HEP for B LE while seated and in supine[TF.2]   Goal #5    Goal #6    Goal Comments: Goals established on 9/1/2017[TF.1]     Attribution Key    TF. 1 - Nasrin Bowman, PT on 9/1/2017  4:19 PM  TF.2 - Nasrin Bowman PT on diagnosed with transient cerebral ischemia and expressive aphasia. Upon evaluation this date 8/31/2017, pt noted to have increased confusion, RN present and aware, Rapid Response team called to assess. ** 8/31 transferred to CTU.   Brain CT on 8/31 negativ No date: REPAIR ING HERNIA,5+Y/O,REDUCIBL    HOME SITUATION  Type of Home: Assisted living facility  Home Layout: One level  Lives With: Alone    Toilet and Equipment: Comfort height toilet  Shower/Tub and Equipment: Walk-in shower;Grab bar; Shower chair  O How much help from another person does the patient currently need…  -   Putting on and taking off regular lower body clothing?: A Lot  -   Bathing (including washing, rinsing, drying)?: A Lot  -   Toileting, which includes using toilet, bedpan or urinal? : [facility] with 24 hr care and home OT. Pt's daughter stated the pt can have increased care with all ADL, including bed mobility.       OT Discharge Recommendations: 24 hour care/supervision (Back to [facility] with 24 hr care and Author:  Jairo Stallworth Service:  Rehab Author Type:  Occupational Therapist    Filed:  8/31/2017  2:47 PM Date of Service:  8/31/2017  1:10 PM Status:  Signed    :  Jairo Stallworth (Occupational Therapist)       OCCUPATIONAL THERAPY EVALUATION - I • Urinary tract infection    • Vertigo    • Visual impairment     glasses   • Weakness[LD.2]        Past Surgical History[LD.1]  Past Surgical History:  No date: APPENDECTOMY  No date: COLECTOMY  2012: HIP SURGERY Right  No date: KNEE REPLACEMENT SURGERY Communication:[LD.1] expressive/receptive deficits  During conversation[LD.4]     Behavioral/Emotional/Social:[LD.1] pleasant/coorpoerative[LD.4]     RANGE OF MOTION AND STRENGTH ASSESSMENT[LD.1]  Unable to formally assess as Rapid Response was called[LD. 5 options. Pt's daughter arrived to room and stated that before she left for lunch, pt did not have this level of confusion[LD. 6] RN present and aware, Rapid Response team called to assess. [LD.1]   Patient End of Session: In bed; With [facility] staff;Needs met;Call Patient will transfer to toilet:  with min assist[LD. 6]    UE Exercise Program Goal  Patient will be[LD.1] supervision[LD. 6] with[LD.1] bilateral AROM[LD.6] HEP (home exercise program).     Additional Goals:[LD.1]  Pt will complete MMSE exam to further eval safely complete premorbid ADL tasks at prior level of[CJ.1] function. [CJ.3] Trial of  skilled SLP service will be implemented to improve cognitive-communication skills in order to increase pt's safety and[CJ.1] PLOF[CJ.3] in prior ADL conversations and tas Goal #4 Patient will name 4 items in a concrete category with 80% consistency. Goal #5 Patient will follow 2 step commands with 80% consistency.     Goal #6    Goal #7    Goal #8[CJ.1]      Prior Living Situation: Assisted living    Prior Level of Functi

## (undated) NOTE — ED AVS SNAPSHOT
[facility] Emergency Department  [place] [address]  Phone:  [phone]  Fax:  [phone]   MRN: [id number]    Department:  [facility] Emergency Department   Date of Visit:  7/20/2017 IF THERE IS ANY CHANGE OR WORSENING OF YOUR CONDITION, CALL YOUR PRIMARY CARE PHYSICIAN AT ONCE OR RETURN IMMEDIATELY TO THE EMERGENCY DEPARTMENT.     If you have been prescribed any medication(s), please fill your prescription right away and begin taking t

## (undated) NOTE — IP AVS SNAPSHOT
[address]            (For Outpatient Use Only) Initial Admit Date: 10/19/2018   Inpt/Obs Admit Date: Inpt: 10/21/18 / Obs: 10/20/18   Discharge Date:    Alec Stevens:  [de-identified]   MRN: [de-identified]   CSN: [id number]        EN Subscriber Name:  Annemarie Rodney :    Subscriber ID:  Pt Rel to Subscriber:    Hospital Account Financial Class: Medicare    2018

## (undated) NOTE — IP AVS SNAPSHOT
Patient Demographics     Address  [address] [place] [address] Phone  [phone] [facility]  [phone] [facility] E-mail Address  [email or web address]      Emergency Contact(s)     Name Relation Home Work [employer] Use as directed 15 mL in the mouth or throat 2 (two) times daily. [    ]   [    ]   [    ]   [    ]     DAILY ROSA ISELA Tabs      Take 1 tablet by mouth daily.     [    ]   [    ]   [    ]   [    ]     DilTIAZem HCl ER Coated Beads 180 MG Cp24  Commonly know Apply 1 Application topically 2 (two) times daily. Groin and tommie area    [    ]   [    ]   [    ]   [    ]     Ondansetron HCl 4 mg tablet  Commonly known as:  ZOFRAN      Take 4 mg by mouth every 8 (eight) hours as needed for Nausea.     [    ]   [    ] Bring a paper prescription for each of these medications  HYDROcodone-acetaminophen 5-325 MG Tabs  Insulin Aspart Pen 100 UNIT/ML Sopn  insulin detemir 100 UNIT/ML Sopn           [id number]-A - MAR ACTION REPORT  (last 24 hrs)    ** SITE UNKNOWN **     Orde [id number] ipratropium-albuterol (DUONEB) nebulizer solution 3 mL 10/26/18 1504 Given      [id number] levETIRAcetam (KEPPRA) tab 250 mg 10/25/18 2025 Given      [id number] levETIRAcetam (KEPPRA) tab 250 mg 10/26/18 0956 Given      [id number] predniSONE (DELTA Order Status:  Completed Lab Status:  Preliminary result Updated:  10/25/18 2200    Specimen:  Blood,peripheral      Blood Culture Result No Growth 4 Days    Blood Culture FREQ X 2 [[id number]] Collected:  10/19/18 2111    Order Status:  Completed Lab Stat Filed:  10/20/2018 12:10 AM Date of Service:  10/19/2018 11:46 PM Status:  Addendum    :  Nikki Tyson MD (Physician)    Related Notes:  Original Note by Nikki Tyson MD (Physician) filed at 10/20/2018 12:05 AM         [facility] HOSPITALIST  Hist • OTHER SURGICAL HISTORY Bilateral     knee replacement   • OTHER SURGICAL HISTORY      colon resection   • OTHER SURGICAL HISTORY      hernia repair   • OTHER SURGICAL HISTORY      femur fx repair   • REPAIR ING HERNIA,5+Y/O,REDUCIBL         Social Histor Morphine Sulfate, Concentrate, 100 MG/5ML Oral Solution Place 5 mg under the tongue 2 (two) times daily as needed. Disp:  Rfl:    insulin aspart 100 UNIT/ML Subcutaneous Solution Inject 25 Units into the skin 3 (three) times daily before meals.  Disp:  Rf Metoprolol Succinate ER (TOPROL XL) 100 MG Oral Take 100 mg by mouth daily. Disp:  Rfl:        Review of Systems:   A comprehensive 14 point review of systems was completed. Pertinent positives and negatives noted in the HPI.     Physical Exam:     2. Has been on bactrim x 3 days  3. Given rocephin in ED  4. Cont PO bactrim sabine[FA.1]  3. HTN  4. DM  5. A. Fib  6. CHFpEF  1. BNP NL  2. Compensated  7. Cerebrovascular disease  1. Residual expressive aphasia  8.  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 Decrease normal dose to 20 U BID until BS increase. Then back to normal dose 40 U BID.  Disp:  Rfl: 0   levETIRAcetam 250 MG Oral Tab Take 1 tablet (250 mg (six) hours as needed for Pain. Disp:  Rfl:    Fexofenadine HCl (HM FEXOFENADINE HCL) 180 MG Oral Tab Take 180 mg by mouth daily. Disp:  Rfl:    furosemide 40 MG Oral Tab Take 40 mg by mouth daily.    Disp:  Rfl:    Vitamin C 500 MG Oral Tab Take 500 mg by Integument: No rashes or lesions. Psychiatric: Appropriate mood and affect.       Diagnostic Data:      Labs:  Recent Labs   Lab  10/19/18   2100   WBC  10.7   HGB  12.5   MCV  93.2   PLT  323.0       Recent Labs   Lab  10/19/18   2100   GLU  79   BUN  14 1. IP Consult to Cardiology Once [[id number]] ordered by Autumn Wood MD at 10/22/18 [address] [place] [address] Cardiology  Consultation Note      Migdalia Wilkins Patient Status:  Inpatient    1924 MRN [id number]   Location [facility] ondansetron HCl (ZOFRAN) injection 4 mg 4 mg Intravenous Q6H PRN   MethylPREDNISolone Sodium Succ (Solu-MEDROL) injection 60 mg 60 mg Intravenous Q6H   ipratropium-albuterol (DUONEB) nebulizer solution 3 mL 3 mL Nebulization QID   Metoprolol Succinate ER ( Past Medical History:   Diagnosis Date   • Arrhythmia    • Arthritis    • Cancer ([address]     basal cell removed from nose over 5 years ago   • COPD (chronic obstructive pulmonary disease) (HCC)    • Diverticulitis    • High blood pressure    • History of blo Ears, nose, mouth, throat, and face: negative for epistaxis  Respiratory:[SK.1] +[SK.2] or dyspnea on exertion  Cardiovascular: negative for chest pain  Gastrointestinal: negative for melena  Genitourinary:negative for hematuria  Hematologic/lymphatic: neg BUN 17 10/22/2018     10/22/2018    K 3.9 10/22/2018     10/22/2018    CO2 32.0 10/22/2018     10/22/2018    CA 9.2 10/22/2018    ALB 2.7 10/22/2018    ALKPHO 88 10/22/2018    BILT 0.4 10/22/2018    TP 6.7 10/22/2018    AST 8 10/22/2018 Upon completion, patient repositioned in bed for comfort with call light in reach. [facility]. [HM.1]     Attribution Valiente    HM. 1 - Claude Cerda on 10/24/2018  2:58 PM                        Occupational Therapy Notes (last 72 hours) (No chest pain. Has some nausea but no vomiting diarrhea. No reported fever. No other complaints.                   Yo Fernandes MD   Physician   HOSPITALIST   H&P   Addendum   Date of Service:  10/19/2018 11:46 PM               Expand All Collapse All • Vertigo    • Visual impairment     glasses   • Weakness[CC.2]        Past Surgical History[CC.1]  Past Surgical History:   Procedure Laterality Date   • APPENDECTOMY     • COLECTOMY     • HIP SURGERY Right 2012   • KNEE REPLACEMENT SURGERY     • BLANE NEED Behavioral/Emotional/Social:[CC.1] Pt was congenial, appropriate and amenable to evaluation[CC. 3]      RANGE OF MOTION AND STRENGTH ASSESSMENT  Upper extremity ROM is within functional limits     Upper extremity strength is within functional limits      activities based on daily tolerance. Sit > Stand inside RW w/MAx A, unsuccessful attempt. Pt then transferred from EOB to B S Recliner using squat-pivot transfer. Pt left with RN in room. [CC.3]  Patient End of Session: Up in chair;Needs met; With [facility] patrick Related Notes:  Addendum by Savanna Rosas OT (Occupational Therapist) filed at 10/24/2018  3:19 PM       OCCUPATIONAL THERAPY EVALUATION - INPATIENT     Room Number: 9670/8650-H  Evaluation Date: 10/23/2018  Type of Evaluation: Initial  Presenting Pr expressive aphasia and SZ disorder. She lives at [facility]. She started to have cough and congestion for the past week or so. +low grade fevers. Around 5 this afternoon she started to have worsening symptoms.   She was brought to the Shower/Tub and Equipment: Walk-in shower;Grab bar; Shower chair;Hand-held showerhead  Other Equipment: (WC)    Occupation/Status: na  Hand Dominance: Right  Drives: No  Patient Regularly Uses: Reading glasses    Prior Level of Function:[CC.1] Pt lives at [facility] Ambulation oxygen flow (liters per minute):  4(bubbler)    ACTIVITIES OF DAILY LIVING ASSESSMENT  AM-PAC ‘6-Clicks’ Inpatient Daily Activity Short Form  How much help from another person does the patient currently need…  -   Putting on and taking off regula [facility]. The AM-RENETTA ' '6-Clicks' Inpatient Daily Activity Short Form was completed and  this patient  is demonstrating a  79.59% degree impairment in activities of daily living.  Research supports that patients with this level of impairment often benefit fro Number of Visits to Meet Established Goals: 5    ADL Goals[CC.1]   Patient will perform grooming: with supervision and while at edge of bed  Patient will perform toileting: with min assist[CC.2]    UE Exercise Program Goal  Patient will be[CC.1] supervisio • COPD (chronic obstructive pulmonary disease) (HCC)    • Diverticulitis    • High blood pressure    • History of blood transfusion    • Other and unspecified hyperlipidemia    • Overactive bladder    • Pneumonia    • Skin cancer    • Stroke ([facility]     2000 Tracheal Aspiration: None  Strategy(ies) Implemented (Thin Liquids): (small single sips pt controlled cup)  Effectiveness: Yes  NECTAR THICK LIQUIDS  Method of Presentation: Teaspoon  Oral Phase of Swallow (VFSS - Nectar Thick Liquids): (premature spillage retraction and pharyngeal contraction adequate with no remarkable post swallow pharyngeal stasis. Pt does pose increased risk for aspiration due to advanced age and medical comorbidities.  Strict adherence to aspiration precautions is recommended to minimi Signed    :  Karlos Dumont, SLP (Speech Pathologist)       SPEECH DAILY NOTE - INPATIENT    ASSESSMENT & PLAN   ASSESSMENT  Pt seen for dysphagia tx to assess tolerance with recommended diet, ensure proper utilization of aspiration precautions and p Goal #2 The patient/family/caregiver will demonstrate understanding and implementation of aspiration precautions and swallow strategies independently over 1-2 session(s).    Goal met with patient   Goal #3 The patient will utilize compensatory strategies a

## (undated) NOTE — IP AVS SNAPSHOT
Patient Demographics     Address  [address] [place] [address] Phone  [phone] [facility]  [phone] [facility] E-mail Address  [email or web address]      Emergency Contact(s)     Name Relation Home Work [employer] Take 1 capsule (500 mg total) by mouth 4 (four) times daily for 5 days.   Stop taking on:  9/28/2018   Babak Dumont MD         Chlorhexidine Gluconate 0.12 % Soln  Commonly known as:  PERIDEX      Use as directed 15 mL in the mouth or throat 2 (two) times Next dose due:  9/23 evening      Apply 1 Application topically 2 (two) times daily. Ondansetron HCl 4 mg tablet  Commonly known as:  ZOFRAN      Take 4 mg by mouth every 8 (eight) hours as needed for Nausea.           Polyethylene Glycol 3350 Powd Order ID Medication Name Action Time Action Reason Comments    [id number] Aspirin-Dipyridamole ER (AGGRENOX)  MG 12 hr cap 1 capsule 09/23/18 0949 Given      [id number] CefTRIAXone Sodium (ROCEPHIN) 1 g in sodium chloride 0.9 % 100 mL MBP/ADD-vantage [id number] Insulin Aspart Pen (NOVOLOG) 100 UNIT/ML flexpen 12 Units 09/22/18 1749 Given      [id number] insulin detemir (LEVEMIR) 100 UNIT/ML flextouch 6 Units 09/23/18 0949 Given            LEFT UPPER ARM     Order ID Medication Name Action Time Action Re Lab - Abbreviation Name Director Address Valid Date Range    ROXI/ [address] [address] 02/03/16 1201 - Present            Microbiology Results (All)     Procedure Component Value Units D No date: Arrhythmia  No date: Arthritis  No date: Cancer [facility])      Comment:  basal cell removed from nose over 5 years ago  No date: Diverticulitis  No date: History of blood transfusion  No date:  Other and unspecified hyperlipidemia  No date: Overactive No current facility-administered medications on file prior to encounter. Current Outpatient Medications on File Prior to Encounter:  LORazepam 2 MG/ML Oral Conc Place 0.5 mg under the tongue every 4 (four) hours as needed.    Disp:  Rfl:    acetaminophen Vitamin C 500 MG Oral Tab Take 500 mg by mouth daily. Disp:  Rfl:    Aspirin-Dipyridamole ER (AGGRENOX)  MG Oral Capsule SR 12 Hr Take 1 capsule by mouth 2 (two) times daily.    Disp:  Rfl:    Potassium Chloride ER (K-DUR M20) 20 MEQ Oral Tab CR Take Psychiatric: Appropriate mood and affect.       Diagnostic Data:      Labs:  Recent Labs   Lab  09/21/18   1235   WBC  17.4*   HGB  12.7   MCV  92.8   PLT  365.0   INR  1.04       Recent Labs   Lab  09/21/18   1235   GLU  139*   BUN  13   CREATSERUM  0.64 RS.1 Eavns Parsons MD on 9/21/2018  5:00 PM  RS. 2 - Jj Santiago MD on 9/21/2018  5:47 PM                        Consults - MD Consult Notes      Consults signed by Ginger Kwan MD at 9/22/2018  1:07 PM     Author:  MD Buzz Meza No date:  Other and unspecified hyperlipidemia  No date: Overactive bladder  No date: Pneumonia  No date: Skin cancer  No date: Stroke [facility])      Comment:  2000  No date: TIA (transient ischemic attack)  No date: Type II or unspecified type diabetes mellitu acetaminophen 650 MG Rectal Suppos Place 650 mg rectally every 4 (four) hours as needed for Fever or Pain.  Disp:  Rfl:  Past Week at Unknown time   Chlorhexidine Gluconate 0.12 % Mouth/Throat Solution Use as directed 15 mL in the mouth or throat 2 (two) ti mouth daily. Disp:  Rfl:  9/20/2018 at 0900   furosemide 40 MG Oral Tab Take 40 mg by mouth daily. Disp:  Rfl:  9/20/2018 at 0900   Vitamin C 500 MG Oral Tab Take 500 mg by mouth daily.  Disp:  Rfl:  9/20/2018 at 0900   Aspirin-Dipyridamole ER ([address]) Lungs[HE.1] coarse BS with ronchi[HE.4]  Heart S1S2  EXT:  No cyanosis, edema      Neurologic Exam:[HE.1]  Alert oriented to people and place but not dates  Followed commands but with somewhat slurred speech, coughing during speaking so sometimes hard to u High risk    (   ) Drug monitoring    (   ) PCA    (   ) Organ failure    (   ) De-escalation of treatment - DNR    (   ) Acute neurologic changes    (   ) Others: New Rx  PROCEDURE DONE    (   ) see notes[HE.1]    Electronically signed by Rosaria Dandy in assisted living. Had increased congestive cough this AM and difficulty eating as well. acording to dtr, speech is worse than usual. Had CXR yesterday that was neg for pa. Denies fever/chills, cp. Unknown time of onset. Has been vomiting x 2 days.  dtr ha insulin glargine 100 UNIT/ML Soln  Commonly known as:  LANTUS  What changed:    · how much to take  · additional instructions      Inject 20 Units into the skin 2 (two) times daily. Decrease normal dose to 20 U BID until BS increase.  Then back to normal do Morphine Sulfate (Concentrate) 100 MG/5ML Soln      Place 5 mg under the tongue 2 (two) times daily as needed. Refills:  0     Nystatin [id number] UNIT/GM Powd      Apply 1 Application topically 2 (two) times daily.    Refills:  0     Ondansetron HCl 4 mg Temp:  [97.6 °F (36.4 °C)-98.9 °F (37.2 °C)] 97.6 °F (36.4 °C)  Pulse:  [69-93] 81  Resp:  [18-24] 18  BP: (111-131)/(49-61) 120/49    Physical Exam:    General: No acute distress.    Respiratory: bronchial gurgling  Cardiovascular: S1, S2. Regular rate and fries and thin pop) with no clinical s/s aspiration. Straw was in cup upon SLP arrival to room, and despite recommendation for no straw, pt appeared to tolerate small single sips via straw without difficulty.   Reviewed aspiration precautions with pt who v

## (undated) NOTE — MR AVS SNAPSHOT
[place] [id number]               Thank you for choosing us for your health care visit with Jolie Osuna MD.  We are glad to serve you and happy to provide you with this olivares authorize routine medications on weekends. ? No narcotics or controlled substances are refilled after noon on Fridays or by on call physicians. ? By law, narcotics cannot be faxed or phoned into your pharmacy.  The prescription must be signed by the provi contact your insurance carrier to verify that your procedure/test has been approved and is a COVERED benefit.   Although the [facility] staff does its due diligence, the insurance carrier gives the disclaimer that \"Although the procedure is authorized, this does Commonly known as:  LASIX           HM FEXOFENADINE  MG Tabs   Generic drug:  Fexofenadine HCl   Take 180 mg by mouth daily. insulin glargine 100 UNIT/ML Soln   Inject 41 Units into the skin nightly.    Commonly known as:  LANTUS MyChart questions? Call [phone] for help. [employer] is NOT to be used for urgent needs. For medical emergencies, dial 911.         Educational Information     Healthy Diet and Regular Exercise  The [employer] of [address] [place] [address] [employer]

## (undated) NOTE — IP AVS SNAPSHOT
[address]            (For Outpatient Use Only) Initial Admit Date: 9/21/2018   Inpt/Obs Admit Date: Inpt: 9/21/18 / Obs: N/A   Discharge Date:    Tonny Captain:  [de-identified]   MRN: [de-identified]   CSN: [id number]        [id number] Subscriber Name:  Cici Talamantes :    Subscriber ID:  Pt Rel to Subscriber:    Hospital Account Financial Class: Medicare    2018

## (undated) NOTE — IP AVS SNAPSHOT
Patient Demographics     Address  [place] [address] Phone  [phone] [facility]  [phone] [facility] E-mail Address  [email or web address]      Emergency Contact(s)     Name Relation Home Work Mobile    [address] DilTIAZem HCl ER Coated Beads 180 MG Cp24  Commonly known as:  CARDIZEM CD  Next dose due:  02/11/2018 0900 AM      Take 1 capsule by mouth daily.    Allison Delgado NP         docusate sodium 100 MG Caps  Commonly known as:  COLACE  Next dose due:  02/11/20 Take 20 mEq by mouth daily.           predniSONE 10 MG Tabs  Commonly known as:  Rogers Betancourt  Next dose due:  02/11/2018 0900 AM      Take 5 Tabs * 3 Days, then take 4 Tabs * 3 Days, then take 3 Tabs * 3 days, then take 2 tabs * 3 Days, then continue 1 tab d [id number] Oseltamivir Phosphate (TAMIFLU) cap 75 mg 02/10/18 0828 Given      [id number] Oxybutynin Chloride ER (DITROPAN-XL) 24 hr tab 5 mg 02/10/18 0828 Given      [id number] PEG 3350 (MIRALAX) powder packet 17 g 02/09/18 1939 Given      [id number] PEG 3350 SpO2  91 % Filed at 02/10/2018 1213      Patient's Most Recent Weight    Flowsheet Row Most Recent Value   Patient Weight  79.9 kg (176 lb 2.4 oz)         Lab Results Last 24 Hours      MAGNESIUM [[id number]] (Normal)  Resulted: 02/10/18 0555, Result status Diagnosis Date   • Arrhythmia    • Arthritis    • Cancer ([facility] [address]     basal cell removed from nose over 5 years ago   • Diverticulitis    • History of blood transfusion    • Other and unspecified hyperlipidemia    • Overactive bladder    • Pneumonia    • Skin Sulfamethoxazole-TMP -160 MG Oral Tab per tablet Take 1 tablet by mouth 2 (two) times daily. Disp:  Rfl:    Tolterodine Tartrate ER 4 MG Oral Capsule SR 24 Hr Take 4 mg by mouth daily.  Disp:  Rfl:    predniSONE 10 MG Oral Tab Take 40mg for 3 days the dextromethorphan-guaiFENesin (ROBITUSSIN DM)  MG/5ML Oral Syrup Take 5 mL by mouth 4 (four) times daily as needed. Disp:  Rfl:    Diltiazem HCl ER Coated Beads (CARDIZEM CD) 180 MG Oral Capsule SR 24 Hr Take 1 capsule by mouth daily.  Disp: 30 capsule Abdomen: Soft, nontender, nondistended. Positive bowel sounds. No rebound, guarding or organomegaly. Neurologic: No focal neurological deficits. CNII-XII grossly intact. Musculoskeletal: Moves all extremities. Extremities: No edema or cyanosis.   Integu Author:  Patrick Tam PT Service:  (none) Author Type:  Physical Therapist    Filed:  2/9/2018 11:51 AM Date of Service:  2/9/2018 10:55 AM Status:  Signed    :  Patrick Tam PT (Physical Therapist)         PHYSICAL THERAPY QUICK EVALUAT No date: OTHER SURGICAL HISTORY      Comment: colon resection  No date: OTHER SURGICAL HISTORY      Comment: hernia repair  No date: OTHER SURGICAL HISTORY      Comment: femur fx repair  No date: REPAIR ING HERNIA,5+Y/O,REDUCIBL[EP.2]    HOME SITUATION[EP. -   Need to walk in hospital room?: A Little   -   Climbing 3-5 steps with a railing?: A Lot[EP.2]       AM-PAC Score:[EP.1]  Raw Score: 17   PT Approx Degree of Impairment Score: 50.57%   Standardized Score (AM-PAC Scale): 42.13   CMS Modifier (G-Code): C admission. GOALS  Patient was able to achieve the following goals . ..     Patient was able to transfer[EP.1] At previous, functional level[EP.4]   Patient able to ambulate on level surfaces[EP.1] At previous, functional level[EP.4]        Attribution Key • Pneumonia    • Skin cancer    • Stroke [facility])     2000   • TIA (transient ischemic attack)    • Type II or unspecified type diabetes mellitus without mention of complication, not stated as uncontrolled    • Unspecified essential hypertension    • Urinary Upper extremity ROM is within functional limits     Upper extremity strength is within functional limits     NEUROLOGICAL FINDINGS                   ACTIVITIES OF DAILY LIVING ASSESSMENT  AM-PAC ‘6-Clicks’ Inpatient Daily Activity Short Form   How much hel Specific performance deficits impacting engagement in ADL/IADL  LOW  1 - 3 performance deficits    Client Assessment/Performance Deficits  LOW - No comorbidities nor modifications of tasks    Clinical Decision Making  LOW - Analysis of occupational profile tolerating a regular with thin liquids at the SNF she is currently residing. Oral motor mechanism exam revealed functional oral motor range, rate, and strength of movements. Functional dentition.   Patient was assessed with thin liquids via spoon, cup, • Visual impairment     glasses   • Weakness        Prior Living Situation: Skilled nursing facility  Diet Prior to Admission: Regular; Thin liquids  Precautions: None    Patient/Family Goals: To eat and drink    SWALLOWING HISTORY  Current Diet Consistency: Electronically signed by ISIDRA Velazquez on 2/9/2018 11:42 AM   Attribution Key    CJ.1 - ISIDRA Velazquez on 2/9/2018 11:30 AM